# Patient Record
Sex: FEMALE | Race: BLACK OR AFRICAN AMERICAN | NOT HISPANIC OR LATINO | Employment: FULL TIME | URBAN - METROPOLITAN AREA
[De-identification: names, ages, dates, MRNs, and addresses within clinical notes are randomized per-mention and may not be internally consistent; named-entity substitution may affect disease eponyms.]

---

## 2022-11-26 ENCOUNTER — APPOINTMENT (OUTPATIENT)
Dept: GENERAL RADIOLOGY | Facility: CLINIC | Age: 62
End: 2022-11-26
Attending: EMERGENCY MEDICINE
Payer: COMMERCIAL

## 2022-11-26 ENCOUNTER — HOSPITAL ENCOUNTER (EMERGENCY)
Facility: CLINIC | Age: 62
Discharge: HOME | End: 2022-11-26
Attending: EMERGENCY MEDICINE | Admitting: EMERGENCY MEDICINE
Payer: COMMERCIAL

## 2022-11-26 VITALS
SYSTOLIC BLOOD PRESSURE: 154 MMHG | HEART RATE: 95 BPM | DIASTOLIC BLOOD PRESSURE: 86 MMHG | OXYGEN SATURATION: 96 % | BODY MASS INDEX: 38.45 KG/M2 | TEMPERATURE: 99.1 F | RESPIRATION RATE: 18 BRPM | WEIGHT: 268 LBS

## 2022-11-26 DIAGNOSIS — J45.21 EXACERBATION OF INTERMITTENT ASTHMA, UNSPECIFIED ASTHMA SEVERITY: ICD-10-CM

## 2022-11-26 DIAGNOSIS — J10.1 INFLUENZA A: ICD-10-CM

## 2022-11-26 LAB
ATRIAL RATE - MUSE: 79 BPM
DIASTOLIC BLOOD PRESSURE - MUSE: NORMAL MMHG
FLUAV RNA SPEC QL NAA+PROBE: POSITIVE
FLUBV RNA RESP QL NAA+PROBE: NEGATIVE
INTERPRETATION ECG - MUSE: NORMAL
P AXIS - MUSE: 70 DEGREES
PR INTERVAL - MUSE: 162 MS
QRS DURATION - MUSE: 78 MS
QT - MUSE: 350 MS
QTC - MUSE: 401 MS
R AXIS - MUSE: 70 DEGREES
RSV RNA SPEC NAA+PROBE: NEGATIVE
SARS-COV-2 RNA RESP QL NAA+PROBE: NEGATIVE
SYSTOLIC BLOOD PRESSURE - MUSE: NORMAL MMHG
T AXIS - MUSE: 39 DEGREES
VENTRICULAR RATE- MUSE: 79 BPM

## 2022-11-26 PROCEDURE — 87637 SARSCOV2&INF A&B&RSV AMP PRB: CPT | Performed by: EMERGENCY MEDICINE

## 2022-11-26 PROCEDURE — C9803 HOPD COVID-19 SPEC COLLECT: HCPCS

## 2022-11-26 PROCEDURE — 93005 ELECTROCARDIOGRAM TRACING: CPT

## 2022-11-26 PROCEDURE — 99285 EMERGENCY DEPT VISIT HI MDM: CPT | Mod: CS,25

## 2022-11-26 PROCEDURE — 71046 X-RAY EXAM CHEST 2 VIEWS: CPT

## 2022-11-26 RX ORDER — PREDNISONE 20 MG/1
TABLET ORAL
Qty: 10 TABLET | Refills: 0 | Status: SHIPPED | OUTPATIENT
Start: 2022-11-26 | End: 2023-03-18

## 2022-11-26 RX ORDER — ALBUTEROL SULFATE 0.83 MG/ML
2.5 SOLUTION RESPIRATORY (INHALATION) EVERY 4 HOURS PRN
Qty: 3 ML | Refills: 0 | Status: SHIPPED | OUTPATIENT
Start: 2022-11-26 | End: 2023-09-28

## 2022-11-26 RX ORDER — OXYCODONE HYDROCHLORIDE 5 MG/1
5 TABLET ORAL EVERY 6 HOURS PRN
Qty: 12 TABLET | Refills: 0 | Status: SHIPPED | OUTPATIENT
Start: 2022-11-26 | End: 2022-11-29

## 2022-11-26 ASSESSMENT — ENCOUNTER SYMPTOMS
COUGH: 1
VOMITING: 0
SHORTNESS OF BREATH: 1
FEVER: 1

## 2022-11-26 NOTE — ED TRIAGE NOTES
Pt complains of a cough, fever, HA, SOB, and severe CP when coughing that started last Wednesday.     Triage Assessment     Row Name 11/26/22 1203       Triage Assessment (Adult)    Airway WDL WDL       Respiratory WDL    Respiratory WDL WDL       Skin Circulation/Temperature WDL    Skin Circulation/Temperature WDL WDL       Cardiac WDL    Cardiac WDL WDL       Peripheral/Neurovascular WDL    Peripheral Neurovascular WDL WDL       Cognitive/Neuro/Behavioral WDL    Cognitive/Neuro/Behavioral WDL WDL

## 2022-11-26 NOTE — ED PROVIDER NOTES
History   Chief Complaint:  Flu Symptoms and Shortness of Breath     The history is provided by the patient.      Willy Aquino is a 62 year old female with history of hyperlipidemia and asthma who presents with a cough, fever, shortness of breath, and severe chest pain when coughing, worsening since onset 3 days ago. The patient has been taking at-home Advair and Proventil for symptom management, however persisting symptoms without relief prompted concern and her presentation to ED at this time. She has had no vomiting. Of note, the patient's neighbor smokes marijuana and she states this causes exacerbation of her asthma flares.    Review of Systems   Constitutional: Positive for fever.   Respiratory: Positive for cough and shortness of breath.    Cardiovascular: Positive for chest pain (only when coughing).   Gastrointestinal: Negative for vomiting.   All other systems reviewed and are negative.     Allergies:  Chloroquine    Medications:  Albuterol  Benzonatate  Cholecalciferol  Advair Diskus  Robitussin  Cholecalciferol  Advair  Proventil  Montelukast    Past Medical History:     TMJ  Chronic anemia  Asthma  Hyperlipidemia  Memory loss  Midline cystocele arthritis of knee  Vitamin D deficiency  Prediabetes  Primary insomnia  PTSD    Past Surgical History:    Appendectomy  D&C    Family History:    Liver disease  Alcohol abuse  Breast cancer    Social History:  The patient presents to the ED with a friend.  The patient arrived in a private vehicle.  PCP: Livier Cyr     Physical Exam     Patient Vitals for the past 24 hrs:   BP Temp Temp src Pulse Resp SpO2 Weight   11/26/22 1208 (!) 154/86 99.1  F (37.3  C) Oral 95 18 96 % 121.6 kg (268 lb)     Physical Exam   GENERAL: well developed, pleasant  HEAD: atraumatic  EYES: pupils reactive, extraocular muscles intact, conjunctivae normal  ENT:  mucus membranes moist  NECK:  trachea midline, normal range of motion  RESPIRATORY: no tachypnea, occasional  wheeze  CVS: normal S1/S2, no murmurs, intact distal pulses  ABDOMEN: soft, nontender, nondistention  MUSCULOSKELETAL: no deformities  SKIN: warm and dry, no acute rashes or ulceration  NEURO: GCS 15, cranial nerves intact, alert and oriented x3  PSYCH:  Mood/affect normal    Emergency Department Course   ECG  ECG results from 11/26/22   EKG 12 lead     Value    Systolic Blood Pressure     Diastolic Blood Pressure     Ventricular Rate 79    Atrial Rate 79    AZ Interval 162    QRS Duration 78        QTc 401    P Axis 70    R AXIS 70    T Axis 39    Interpretation ECG      Sinus rhythm  Septal infarct , age undetermined  Abnormal ECG  When compared with ECG of 15-MAY-2014 01:39,  No significant change was found  Confirmed by GENERATED REPORT, COMPUTER ([id number]),  Zuleyma Brown ([id number]) on 11/26/2022 12:19:51 PM       Imaging:  Chest XR,  PA & LAT   Final Result   IMPRESSION: Negative chest.        Report per radiology    Laboratory:  Labs Ordered and Resulted from Time of ED Arrival to Time of ED Departure   INFLUENZA A/B & SARS-COV2 PCR MULTIPLEX - Abnormal       Result Value    Influenza A PCR Positive (*)     Influenza B PCR Negative      RSV PCR Negative      SARS CoV2 PCR Negative        Emergency Department Course:      Reviewed:  I reviewed nursing notes, vitals, past medical history and Care Everywhere.    Assessments:  1605 I obtained history and examined the patient as noted above. I explained findings and believe that they are safe for discharge at this time.     Disposition:  The patient was discharged to home.     Impression & Plan     Medical Decision Making:    Patient presents with fevers chills cough and severe pain with coughing where she gets into a spasm.  She had an episode while I was examining her and looks to have significant pain with coughing but then is comfortable when not coughing.  This looks to be either combination of musculoskeletal pleuritic and or diaphragmatic  irritation.  She otherwise satting well.  Influenza A is positive.  No infiltrates on chest x-ray.  Discussed outpatient management with her.    Diagnosis:    ICD-10-CM    1. Influenza A  J10.1       2. Exacerbation of intermittent asthma, unspecified asthma severity  J45.21         Discharge Medications:  New Prescriptions    ALBUTEROL (PROVENTIL) (2.5 MG/3ML) 0.083% NEB SOLUTION    Take 1 vial (2.5 mg) by nebulization every 4 hours as needed for shortness of breath / dyspnea    OXYCODONE (ROXICODONE) 5 MG TABLET    Take 1 tablet (5 mg) by mouth every 6 hours as needed for moderate to severe pain    PREDNISONE (DELTASONE) 20 MG TABLET    Take two tablets (= 40mg) each day for 5 (five) days     Scribe Disclosure:  DARCY, Isidra Kessler, am serving as a scribe at 3:51 PM on 11/26/2022 to document services personally performed by Barrera Gary MD based on my observations and the provider's statements to me.     Barrera Gary MD  11/27/22 2250

## 2022-12-15 ENCOUNTER — DOCUMENTATION ONLY (OUTPATIENT)
Dept: EMERGENCY MEDICINE | Facility: CLINIC | Age: 62
End: 2022-12-15
Payer: COMMERCIAL

## 2022-12-15 DIAGNOSIS — Z53.9 ERRONEOUS ENCOUNTER--DISREGARD: ICD-10-CM

## 2022-12-15 DIAGNOSIS — J45.21 MILD INTERMITTENT ASTHMA WITH EXACERBATION: Primary | ICD-10-CM

## 2022-12-23 ENCOUNTER — DOCUMENTATION ONLY (OUTPATIENT)
Dept: EMERGENCY MEDICINE | Facility: CLINIC | Age: 62
End: 2022-12-23

## 2022-12-23 DIAGNOSIS — J45.21 MILD INTERMITTENT ASTHMA WITH EXACERBATION: Primary | ICD-10-CM

## 2023-01-30 ENCOUNTER — APPOINTMENT (OUTPATIENT)
Dept: GENERAL RADIOLOGY | Facility: CLINIC | Age: 63
End: 2023-01-30
Attending: EMERGENCY MEDICINE
Payer: COMMERCIAL

## 2023-01-30 ENCOUNTER — HOSPITAL ENCOUNTER (EMERGENCY)
Facility: CLINIC | Age: 63
Discharge: HOME | End: 2023-01-30
Attending: EMERGENCY MEDICINE | Admitting: EMERGENCY MEDICINE
Payer: COMMERCIAL

## 2023-01-30 VITALS
DIASTOLIC BLOOD PRESSURE: 79 MMHG | OXYGEN SATURATION: 99 % | HEART RATE: 64 BPM | TEMPERATURE: 97.6 F | SYSTOLIC BLOOD PRESSURE: 155 MMHG | WEIGHT: 268 LBS | BODY MASS INDEX: 38.45 KG/M2 | RESPIRATION RATE: 18 BRPM

## 2023-01-30 DIAGNOSIS — S93.401A SPRAIN OF RIGHT ANKLE, UNSPECIFIED LIGAMENT, INITIAL ENCOUNTER: ICD-10-CM

## 2023-01-30 DIAGNOSIS — S93.402A SPRAIN OF LEFT ANKLE, UNSPECIFIED LIGAMENT, INITIAL ENCOUNTER: ICD-10-CM

## 2023-01-30 PROCEDURE — 73630 X-RAY EXAM OF FOOT: CPT | Mod: RT

## 2023-01-30 PROCEDURE — 99285 EMERGENCY DEPT VISIT HI MDM: CPT

## 2023-01-30 PROCEDURE — 73610 X-RAY EXAM OF ANKLE: CPT | Mod: LT

## 2023-01-30 ASSESSMENT — ACTIVITIES OF DAILY LIVING (ADL): ADLS_ACUITY_SCORE: 33

## 2023-01-31 ASSESSMENT — ENCOUNTER SYMPTOMS: WOUND: 1

## 2023-01-31 NOTE — ED TRIAGE NOTES
Patient presents with complaints of right foot pain that started 3 days ago when she was exercising. Pain is localized to the top of her right foot.      Triage Assessment     Row Name 01/30/23 2035       Triage Assessment (Adult)    Airway WDL WDL       Respiratory WDL    Respiratory WDL WDL       Skin Circulation/Temperature WDL    Skin Circulation/Temperature WDL WDL       Cardiac WDL    Cardiac WDL WDL       Peripheral/Neurovascular WDL    Peripheral Neurovascular WDL WDL       Cognitive/Neuro/Behavioral WDL    Cognitive/Neuro/Behavioral WDL WDL

## 2023-01-31 NOTE — ED NOTES
Rapid Assessment Note    History:   Willy Aquino is a 62 year old female who presents with right dorsal foot pain. She was going up the stairs and skipped two steps at once and consequently injured her right foot through an unspecified mechanism about 3 days ago. She is able to ambulate on the foot but limps. She was able to drive herself here.       Exam:   General:  Alert, interactive, limping  Cardiovascular:  Well perfused  Lungs:  No respiratory distress, no accessory muscle use  Musculoskeletal: Tenderness to palpation to the proximal right foot with no tenderness to palpation over the bilateral malleolus of the right ankle  Neuro:  Moving all 4 extremities  Skin:  Warm, dry  Psych:  Normal affect      Plan of Care:   I evaluated the patient and developed an initial plan of care. I discussed this plan and explained that I, or one of my partners, would be returning to complete the evaluation.       I, Jonna Street, am serving as a scribe to document services personally performed by Morris Simental MD, based on my observations and the provider's statements to me.    1/30/2023  [facility]    Portions of this medical record were completed by a scribe. UPON MY REVIEW AND AUTHENTICATION BY ELECTRONIC SIGNATURE, this confirms (a) I performed the applicable clinical services, and (b) the record is accurate.      Morris Simental MD  01/31/23 0007

## 2023-01-31 NOTE — ED PROVIDER NOTES
History     Chief Complaint:  Foot Pain (Right)       The history is provided by the patient.      Willy Aquino is a 62 year old female with a history of hyperlipidemia, prediabetes, and TMJ who presents with right foot pain. Patient reports that she had a fall incident in the bathroom about 5 weeks ago. She did injure her left ankle and says it is tender to touch. She states that 3 days ago, she was climbing 2 stairs at a time when she stepped funny onto her right foot. She is able to walk, but is not able to bear much weight onto her right side. She states that her left hip is starting to hurt due to having to bear lots of weight on that side. She has been taking Aleve with no relief. She has not tried treating it with cold compressions or wrapping it.       Independent Historian: No    Review of External Notes: Care Everywhere    ROS:  Review of Systems   Skin: Positive for wound.   All other systems reviewed and are negative.      Allergies:  Chloroquine     Medications:    Albuterol  Benzonatate  Fluticasone-salmeterol  Guaifenesin-codeine  Prednisone     Past Medical History:    Anemia  Asthma  Bruxism  Disorder of jaw  TMJ  Hyperlipidemia  Midline cystocele   Arthritis of knee  Plantar fasciitis  Vitamin D deficiency  Prediabetes  PTSD  Influenza A    Past Surgical History:    Appendectomy  Dilation and curettage    Family History:    Mother - breast cancer  Father - liver disease, alcohol abuse    Social History:  reports that she has never smoked. She does not have any smokeless tobacco history on file. She reports current alcohol use.  Presents alone.  Presents via private vehicle.   PCP: Livier Cyr     Physical Exam     Patient Vitals for the past 24 hrs:   BP Temp Temp src Pulse Resp SpO2 Weight   01/30/23 2038 (!) 155/79 97.6  F (36.4  C) Temporal 64 18 99 % 121.6 kg (268 lb)        Physical Exam  Middle age black female. Supine. No respiratory distress.   Muscular skeletal:    Left leg: no  tenderness of knee or calf. Distal fibula tenderness.   No joint diffiusion. Medial malleolus tenderness. No achilles tenderness. Able to dorsi and plantar flex and invert and rajesh. Distal CMS still intact.    Right leg: no tenderness of knee or calf,. slight tenderness inferior to malleolus and dorsal to midfoot. Able to dorsir and plantar flex. Able to invert. Pain with eversion. CMS distally intact.   Neuro: Alert. Awake. Appropriate.     Emergency Department Course   Imaging:  Ankle XR, G/E 3 views, right   Final Result   IMPRESSION: Normal joint spaces and alignment. No fracture.      XR Foot Right 3 Views   Final Result   IMPRESSION: No fractures are identified. Bunion deformity of the great toe. Mild degenerative changes in the first MTP joint. The remaining joint spaces are unremarkable.      XR Ankle Left G/E 3 Views   Final Result   IMPRESSION: No fractures are evident. Lateral soft tissue swelling. The ankle mortise is intact. The talar dome is unremarkable.         Report per radiology    Emergency Department Course & Assessments:     Independent Interpretation (X-rays, CTs, rhythm strip):  I agree, as stated above.     ED Course as of 01/31/23 0029   Mon Jan 30, 2023 2118 I examined and obtained patient's history.        Social Determinants of Health affecting care:  None.     Disposition:  The patient was discharged to home.     Impression & Plan    Medical Decision Making:  This is a 62 year old woman, who states going up some stairs about 3 days ago and stepped funny on her right foot. She now has pain on top of her foot. She also feels some pain in the medial aspect of her ankle. In addition, she states, about 5 weeks ago, she misstep going into the bathtub and hurt her left ankle. This has been painful for 5 weeks, more on the outside of the ankle but has not seen anybody for it. She has been walking and has occasionally taking Aleve for comfort. On exam of both ankles, there was some left ankle  tenderness, more over the distal fibula. It is not directly over the malleolus. There was no obvious effusion or ligament elasticity. There was no foot tenderness. On the right foot, there was minimal tenderness over the very proximal dorsum of foot. But there was some tenderness over the medial joint space, no effusion there or ligament elasticity. Xray of both ankles and the right foot are negative. This seems most consistent with subacute left ankle marry and more acute right ankle sprain. We placed a strap on the right foot and put bilateral air splint. She should use cold and elevation. She may continue with Aleve. I have referred her to our orthopedics on-call, Dr. THANIA Tovar.     Diagnosis:    ICD-10-CM    1. Sprain of right ankle, unspecified ligament, initial encounter  S93.401A       2. Sprain of left ankle, unspecified ligament, initial encounter  S93.402A              Scribe Disclosure:  I, Yadiel Claudio, am serving as a scribe at 9:27 PM on 1/30/2023 to document services personally performed by Sidney Sifuentes MD based on my observations and the provider's statements to me.     1/30/2023   Sidney Sifuentes MD Steinman, Randall Ira, MD  01/31/23 0118

## 2023-03-17 ENCOUNTER — HOSPITAL ENCOUNTER (EMERGENCY)
Facility: CLINIC | Age: 63
Discharge: HOME | End: 2023-03-18
Attending: EMERGENCY MEDICINE | Admitting: EMERGENCY MEDICINE
Payer: COMMERCIAL

## 2023-03-17 ENCOUNTER — APPOINTMENT (OUTPATIENT)
Dept: GENERAL RADIOLOGY | Facility: CLINIC | Age: 63
End: 2023-03-17
Attending: EMERGENCY MEDICINE
Payer: COMMERCIAL

## 2023-03-17 VITALS
HEART RATE: 79 BPM | OXYGEN SATURATION: 100 % | RESPIRATION RATE: 18 BRPM | SYSTOLIC BLOOD PRESSURE: 120 MMHG | DIASTOLIC BLOOD PRESSURE: 61 MMHG | WEIGHT: 248 LBS | HEIGHT: 69 IN | TEMPERATURE: 99.7 F | BODY MASS INDEX: 36.73 KG/M2

## 2023-03-17 DIAGNOSIS — U07.1 INFECTION DUE TO 2019 NOVEL CORONAVIRUS: ICD-10-CM

## 2023-03-17 LAB
ANION GAP SERPL CALCULATED.3IONS-SCNC: 9 MMOL/L (ref 7–15)
BASOPHILS # BLD AUTO: 0 10E3/UL (ref 0–0.2)
BASOPHILS NFR BLD AUTO: 0 %
BUN SERPL-MCNC: 7.1 MG/DL (ref 8–23)
CALCIUM SERPL-MCNC: 9.5 MG/DL (ref 8.8–10.2)
CHLORIDE SERPL-SCNC: 100 MMOL/L (ref 98–107)
CREAT SERPL-MCNC: 0.8 MG/DL (ref 0.51–0.95)
DEPRECATED HCO3 PLAS-SCNC: 29 MMOL/L (ref 22–29)
EOSINOPHIL # BLD AUTO: 0 10E3/UL (ref 0–0.7)
EOSINOPHIL NFR BLD AUTO: 0 %
ERYTHROCYTE [DISTWIDTH] IN BLOOD BY AUTOMATED COUNT: 14.6 % (ref 10–15)
FLUAV RNA SPEC QL NAA+PROBE: NEGATIVE
FLUBV RNA RESP QL NAA+PROBE: NEGATIVE
GFR SERPL CREATININE-BSD FRML MDRD: 84 ML/MIN/1.73M2
GLUCOSE SERPL-MCNC: 99 MG/DL (ref 70–99)
HCT VFR BLD AUTO: 34.3 % (ref 35–47)
HGB BLD-MCNC: 10.9 G/DL (ref 11.7–15.7)
HOLD SPECIMEN: NORMAL
IMM GRANULOCYTES # BLD: 0 10E3/UL
IMM GRANULOCYTES NFR BLD: 0 %
LYMPHOCYTES # BLD AUTO: 0.5 10E3/UL (ref 0.8–5.3)
LYMPHOCYTES NFR BLD AUTO: 15 %
MCH RBC QN AUTO: 26.9 PG (ref 26.5–33)
MCHC RBC AUTO-ENTMCNC: 31.8 G/DL (ref 31.5–36.5)
MCV RBC AUTO: 85 FL (ref 78–100)
MONOCYTES # BLD AUTO: 0.3 10E3/UL (ref 0–1.3)
MONOCYTES NFR BLD AUTO: 9 %
NEUTROPHILS # BLD AUTO: 2.4 10E3/UL (ref 1.6–8.3)
NEUTROPHILS NFR BLD AUTO: 76 %
NRBC # BLD AUTO: 0 10E3/UL
NRBC BLD AUTO-RTO: 0 /100
PLATELET # BLD AUTO: 215 10E3/UL (ref 150–450)
POTASSIUM SERPL-SCNC: 3.9 MMOL/L (ref 3.4–5.3)
RBC # BLD AUTO: 4.05 10E6/UL (ref 3.8–5.2)
RSV RNA SPEC NAA+PROBE: NEGATIVE
SARS-COV-2 RNA RESP QL NAA+PROBE: POSITIVE
SODIUM SERPL-SCNC: 138 MMOL/L (ref 136–145)
WBC # BLD AUTO: 3.2 10E3/UL (ref 4–11)

## 2023-03-17 PROCEDURE — 250N000009 HC RX 250: Performed by: EMERGENCY MEDICINE

## 2023-03-17 PROCEDURE — 250N000013 HC RX MED GY IP 250 OP 250 PS 637: Performed by: EMERGENCY MEDICINE

## 2023-03-17 PROCEDURE — 36415 COLL VENOUS BLD VENIPUNCTURE: CPT | Performed by: EMERGENCY MEDICINE

## 2023-03-17 PROCEDURE — 250N000012 HC RX MED GY IP 250 OP 636 PS 637: Performed by: EMERGENCY MEDICINE

## 2023-03-17 PROCEDURE — 71045 X-RAY EXAM CHEST 1 VIEW: CPT

## 2023-03-17 PROCEDURE — 85025 COMPLETE CBC W/AUTO DIFF WBC: CPT | Performed by: EMERGENCY MEDICINE

## 2023-03-17 PROCEDURE — 87637 SARSCOV2&INF A&B&RSV AMP PRB: CPT | Performed by: EMERGENCY MEDICINE

## 2023-03-17 PROCEDURE — C9803 HOPD COVID-19 SPEC COLLECT: HCPCS

## 2023-03-17 PROCEDURE — 99284 EMERGENCY DEPT VISIT MOD MDM: CPT | Mod: CS,25

## 2023-03-17 PROCEDURE — 94640 AIRWAY INHALATION TREATMENT: CPT

## 2023-03-17 PROCEDURE — 82310 ASSAY OF CALCIUM: CPT | Performed by: EMERGENCY MEDICINE

## 2023-03-17 RX ORDER — PREDNISONE 20 MG/1
40 TABLET ORAL ONCE
Status: COMPLETED | OUTPATIENT
Start: 2023-03-17 | End: 2023-03-17

## 2023-03-17 RX ORDER — ACETAMINOPHEN 500 MG
1000 TABLET ORAL ONCE
Status: COMPLETED | OUTPATIENT
Start: 2023-03-17 | End: 2023-03-17

## 2023-03-17 RX ORDER — IPRATROPIUM BROMIDE AND ALBUTEROL SULFATE 2.5; .5 MG/3ML; MG/3ML
3 SOLUTION RESPIRATORY (INHALATION)
Status: DISCONTINUED | OUTPATIENT
Start: 2023-03-17 | End: 2023-03-18 | Stop reason: HOSPADM

## 2023-03-17 RX ADMIN — ACETAMINOPHEN 1000 MG: 500 TABLET ORAL at 20:52

## 2023-03-17 RX ADMIN — IPRATROPIUM BROMIDE AND ALBUTEROL SULFATE 3 ML: 2.5; .5 SOLUTION RESPIRATORY (INHALATION) at 21:23

## 2023-03-17 RX ADMIN — PREDNISONE 40 MG: 20 TABLET ORAL at 20:52

## 2023-03-17 ASSESSMENT — ENCOUNTER SYMPTOMS
VOMITING: 0
SORE THROAT: 1
DIARRHEA: 0
MYALGIAS: 1
COUGH: 1
CHILLS: 1
HEADACHES: 1
FEVER: 1

## 2023-03-17 ASSESSMENT — ACTIVITIES OF DAILY LIVING (ADL)
ADLS_ACUITY_SCORE: 35
ADLS_ACUITY_SCORE: 35

## 2023-03-18 RX ORDER — FLUTICASONE PROPIONATE AND SALMETEROL 500; 50 UG/1; UG/1
1 POWDER RESPIRATORY (INHALATION) EVERY 12 HOURS
Qty: 1 EACH | Refills: 0 | Status: SHIPPED | OUTPATIENT
Start: 2023-03-18 | End: 2023-09-28

## 2023-03-18 RX ORDER — PREDNISONE 20 MG/1
40 TABLET ORAL DAILY
Qty: 8 TABLET | Refills: 0 | Status: SHIPPED | OUTPATIENT
Start: 2023-03-18 | End: 2023-03-22

## 2023-03-18 ASSESSMENT — ACTIVITIES OF DAILY LIVING (ADL): ADLS_ACUITY_SCORE: 35

## 2023-03-18 NOTE — ED PROVIDER NOTES
History     Chief Complaint:  Flu Symptoms (Cough, body aches, fever and nasal congestion. )       The history is provided by the patient.      Willy Aquino is a 62 year old female who presents to the Emergency Department for evaluation of flu symptoms. The patient states that 3 days ago she began to experience congestion, cough, fever, chills, general myalgia, headaches and sore throat which has persisted, leading her to come to the ED. She mentions that she got the Flu last November and feels similar to then. She denies any vomiting or diarrhea. No shortness of breath. Patient did use her Proair inhaler last night as her asthma is mildly bothersome since she has been ill. She denies any other symptoms.    Independent Historian:   None - Patient Only    Review of External Notes: none     ROS:  Review of Systems   Constitutional: Positive for chills and fever.   HENT: Positive for congestion and sore throat.    Respiratory: Positive for cough.    Gastrointestinal: Negative for diarrhea and vomiting.   Musculoskeletal: Positive for myalgias.   Neurological: Positive for headaches.   All other systems reviewed and are negative.      Allergies:  Chloroquine     Medications:    Albuterol   Tessalon     Past Medical History:    Anemia   Asthma   Bruxism   Hyperlipidemia   Prediabetes   Insomnia   PTSD  Memory loss     Past Surgical History:    Appendectomy    D and C    Family History:    Breast cancer    Liver disease     Social History:   reports that she has never smoked. She does not have any smokeless tobacco history on file. She reports current alcohol use.  PCP: Livier Cyr     Physical Exam     Patient Vitals for the past 24 hrs:   BP Temp Temp src Pulse Resp SpO2 Height Weight   03/17/23 2336 -- 99.7  F (37.6  C) Oral -- -- -- -- --   03/17/23 2331 -- -- -- -- -- 100 % -- --   03/17/23 2330 120/61 -- -- 79 -- -- -- --   03/17/23 2134 -- -- -- -- -- 100 % -- --   03/17/23 2030 -- (!) 101.5  F (38.6  C)  "Oral -- -- -- -- --   03/17/23 2002 (!) 143/88 -- -- 66 -- -- -- --   03/17/23 1930 (!) 147/75 98.4  F (36.9  C) Temporal 73 18 97 % 1.753 m (5' 9\") 112.5 kg (248 lb)        Physical Exam  General: Nontoxic. Resting comfortably. Frequent cough.  Head:  Scalp, face, and head appear normal  Eyes:  Pupils are equal, round    Conjunctivae non-injected and sclerae white  ENT:    The external nose is normal    Pinnae are normal  Neck:  Normal range of motion    There is no rigidity noted    Trachea is in the midline  CV:  Regular rate and rhythm     Normal S1/S2, no S3/S4    No murmur or rub. Radial pulses 2+ bilaterally.  Resp:  Lungs are equal bilaterally  There is no tachypnea    No increased work of breathing    No rales, or rhonchi. Mild expiratory wheezing in bilateral lower lung fields  GI:  Abdomen is soft, no rigidity or guarding    No distension, or mass    No tenderness or rebound tenderness   MS:  Normal muscular tone  Skin:  No rash or acute skin lesions noted  Neuro: Awake and alert  Speech is normal and fluent  Moves all extremities spontaneously  Psych:  Normal affect. Appropriate interactions.      Emergency Department Course     Imaging:  XR Chest Port 1 View   Final Result   IMPRESSION: Negative chest.         Report per radiology    Laboratory:  Labs Ordered and Resulted from Time of ED Arrival to Time of ED Departure   INFLUENZA A/B, RSV, & SARS-COV2 PCR - Abnormal       Result Value    Influenza A PCR Negative      Influenza B PCR Negative      RSV PCR Negative      SARS CoV2 PCR Positive (*)    BASIC METABOLIC PANEL - Abnormal    Sodium 138      Potassium 3.9      Chloride 100      Carbon Dioxide (CO2) 29      Anion Gap 9      Urea Nitrogen 7.1 (*)     Creatinine 0.80      Calcium 9.5      Glucose 99      GFR Estimate 84     CBC WITH PLATELETS AND DIFFERENTIAL - Abnormal    WBC Count 3.2 (*)     RBC Count 4.05      Hemoglobin 10.9 (*)     Hematocrit 34.3 (*)     MCV 85      MCH 26.9      MCHC 31.8   "    RDW 14.6      Platelet Count 215      % Neutrophils 76      % Lymphocytes 15      % Monocytes 9      % Eosinophils 0      % Basophils 0      % Immature Granulocytes 0      NRBCs per 100 WBC 0      Absolute Neutrophils 2.4      Absolute Lymphocytes 0.5 (*)     Absolute Monocytes 0.3      Absolute Eosinophils 0.0      Absolute Basophils 0.0      Absolute Immature Granulocytes 0.0      Absolute NRBCs 0.0          Procedures       Emergency Department Course & Assessments:         Interventions:  Medications   acetaminophen (TYLENOL) tablet 1,000 mg (1,000 mg Oral $Given 3/17/23 2052)   predniSONE (DELTASONE) tablet 40 mg (40 mg Oral $Given 3/17/23 2052)        Independent Interpretation (X-rays, CTs, rhythm strip):  Consultations/Discussion of Management or Tests:  Assessments:  ED Course as of 03/18/23 1409   Fri Mar 17, 2023   2022 I examined the patient and obtained history as shown above       Social Determinants of Health affecting care:   None    Disposition:  The patient was discharged to home.     Impression & Plan        Medical Decision Making:  Willy Aquino is a 62 year old female who presents to the emergency department today with fever, cough, malaise, and symptoms of likely viral syndrome. A broad ddx was considered including viral and bacterial causes of infection including URI, pharyngitis, bronchitis, pneumonia, influenza, COVID-19, OM, Strep pharyngitis, sinus infection, among others. Clinically the patient is well appearing without increased work of breathing, respiratory distress, hypoxia, signs of ARDS or other serious decompensation or complication. Clinical signs and symptoms are not consistent with meningitis or sepsis. COVID-19 PCR positive. Negative for influenza and RSV. CXR was obtained and negative for acute findings. Patient treated with IVF, prednisone given her history of asthma. No severe asthma exacerbation, increased work of breathing, resp distress or hypoxia. Patient otherwise  well appearing. Paxlovid indicated and prescribed. Will continue 5 day burst of prednisone. Patient notes a hisory of known chronic anemia. Hgb 10.9 today. Will need PCP follow up.     I recommended supportive care for treatment of  COVID-19. Tylenol for fever control. Good oral fluid intake. Discussed the importance of home quarantine and CDC guidelines on self-quarantine were provided as part of discharge instructions. No indication for hospitalization at this time. Return precautions were discussed with patient. The patient's questions were answered and the patient was agreeable with discharge.          Diagnosis:    ICD-10-CM    1. Infection due to 2019 novel coronavirus  U07.1            Discharge Medications:  Discharge Medication List as of 3/18/2023  1:36 AM      START taking these medications    Details   !! fluticasone-salmeterol (ADVAIR) 500-50 MCG/ACT inhaler Inhale 1 puff into the lungs every 12 hours, Disp-1 each, R-0, E-Prescribe      nirmatrelvir and ritonavir (PAXLOVID) 300 mg/100 mg therapy pack Take 3 tablets by mouth 2 times daily for 5 days, Disp-30 tablet, R-0, E-Prescribe       !! - Potential duplicate medications found. Please discuss with provider.         Scribe Disclosure:  I, Modesto Oneal, am serving as a scribe at 7:44 PM on 3/17/2023 to document services personally performed by Domo Chandra MD based on my observations and the provider's statements to me.    3/17/2023   Domo Chandra MD Daro, Ryan Clay, MD  03/18/23 2012

## 2023-03-18 NOTE — ED TRIAGE NOTES
Triage Assessment     Row Name 03/17/23 1937       Triage Assessment (Adult)    Airway WDL WDL       Respiratory WDL    Respiratory WDL cough    Cough Frequency frequent    Cough Type productive       Skin Circulation/Temperature WDL    Skin Circulation/Temperature WDL WDL       Cardiac WDL    Cardiac WDL WDL       Peripheral/Neurovascular WDL    Peripheral Neurovascular WDL WDL       Cognitive/Neuro/Behavioral WDL    Cognitive/Neuro/Behavioral WDL WDL            Cough, body aches, fever and nasal congestion.

## 2023-03-18 NOTE — DISCHARGE INSTRUCTIONS

## 2023-09-28 ENCOUNTER — OFFICE VISIT (OUTPATIENT)
Dept: PEDIATRICS | Facility: CLINIC | Age: 63
End: 2023-09-28
Payer: COMMERCIAL

## 2023-09-28 VITALS
RESPIRATION RATE: 16 BRPM | HEIGHT: 69 IN | OXYGEN SATURATION: 99 % | DIASTOLIC BLOOD PRESSURE: 77 MMHG | WEIGHT: 268.7 LBS | HEART RATE: 60 BPM | BODY MASS INDEX: 39.8 KG/M2 | SYSTOLIC BLOOD PRESSURE: 118 MMHG | TEMPERATURE: 97.5 F

## 2023-09-28 DIAGNOSIS — Z00.00 ROUTINE GENERAL MEDICAL EXAMINATION AT A HEALTH CARE FACILITY: Primary | ICD-10-CM

## 2023-09-28 DIAGNOSIS — J45.909 ASTHMA, UNSPECIFIED ASTHMA SEVERITY, UNSPECIFIED WHETHER COMPLICATED, UNSPECIFIED WHETHER PERSISTENT: ICD-10-CM

## 2023-09-28 DIAGNOSIS — Z12.11 SCREEN FOR COLON CANCER: ICD-10-CM

## 2023-09-28 DIAGNOSIS — D64.9 ANEMIA, UNSPECIFIED TYPE: ICD-10-CM

## 2023-09-28 DIAGNOSIS — M25.562 LEFT KNEE PAIN, UNSPECIFIED CHRONICITY: ICD-10-CM

## 2023-09-28 LAB
ALBUMIN SERPL BCG-MCNC: 4.2 G/DL (ref 3.5–5.2)
ALP SERPL-CCNC: 38 U/L (ref 35–104)
ALT SERPL W P-5'-P-CCNC: 15 U/L (ref 0–50)
ANION GAP SERPL CALCULATED.3IONS-SCNC: 10 MMOL/L (ref 7–15)
AST SERPL W P-5'-P-CCNC: 24 U/L (ref 0–45)
BILIRUB SERPL-MCNC: 0.3 MG/DL
BUN SERPL-MCNC: 8.8 MG/DL (ref 8–23)
CALCIUM SERPL-MCNC: 9.4 MG/DL (ref 8.8–10.2)
CHLORIDE SERPL-SCNC: 106 MMOL/L (ref 98–107)
CHOLEST SERPL-MCNC: 186 MG/DL
CREAT SERPL-MCNC: 0.68 MG/DL (ref 0.51–0.95)
EGFRCR SERPLBLD CKD-EPI 2021: >90 ML/MIN/1.73M2
FERRITIN SERPL-MCNC: 101 NG/ML (ref 11–328)
GLUCOSE SERPL-MCNC: 100 MG/DL (ref 70–99)
HBA1C MFR BLD: 6 % (ref 0–5.6)
HCO3 SERPL-SCNC: 27 MMOL/L (ref 22–29)
HDLC SERPL-MCNC: 81 MG/DL
IRON BINDING CAPACITY (ROCHE): 243 UG/DL (ref 240–430)
IRON SATN MFR SERPL: 33 % (ref 15–46)
IRON SERPL-MCNC: 80 UG/DL (ref 37–145)
LDLC SERPL CALC-MCNC: 96 MG/DL
NONHDLC SERPL-MCNC: 105 MG/DL
POTASSIUM SERPL-SCNC: 4.3 MMOL/L (ref 3.4–5.3)
PROT SERPL-MCNC: 6.7 G/DL (ref 6.4–8.3)
SODIUM SERPL-SCNC: 143 MMOL/L (ref 135–145)
TRIGL SERPL-MCNC: 47 MG/DL

## 2023-09-28 PROCEDURE — 82728 ASSAY OF FERRITIN: CPT | Performed by: STUDENT IN AN ORGANIZED HEALTH CARE EDUCATION/TRAINING PROGRAM

## 2023-09-28 PROCEDURE — 83540 ASSAY OF IRON: CPT | Performed by: STUDENT IN AN ORGANIZED HEALTH CARE EDUCATION/TRAINING PROGRAM

## 2023-09-28 PROCEDURE — 80053 COMPREHEN METABOLIC PANEL: CPT | Performed by: STUDENT IN AN ORGANIZED HEALTH CARE EDUCATION/TRAINING PROGRAM

## 2023-09-28 PROCEDURE — 99386 PREV VISIT NEW AGE 40-64: CPT | Mod: GC | Performed by: STUDENT IN AN ORGANIZED HEALTH CARE EDUCATION/TRAINING PROGRAM

## 2023-09-28 PROCEDURE — 90472 IMMUNIZATION ADMIN EACH ADD: CPT | Performed by: STUDENT IN AN ORGANIZED HEALTH CARE EDUCATION/TRAINING PROGRAM

## 2023-09-28 PROCEDURE — 80061 LIPID PANEL: CPT | Performed by: STUDENT IN AN ORGANIZED HEALTH CARE EDUCATION/TRAINING PROGRAM

## 2023-09-28 PROCEDURE — 83550 IRON BINDING TEST: CPT | Performed by: STUDENT IN AN ORGANIZED HEALTH CARE EDUCATION/TRAINING PROGRAM

## 2023-09-28 PROCEDURE — 90682 RIV4 VACC RECOMBINANT DNA IM: CPT | Performed by: STUDENT IN AN ORGANIZED HEALTH CARE EDUCATION/TRAINING PROGRAM

## 2023-09-28 PROCEDURE — 90715 TDAP VACCINE 7 YRS/> IM: CPT | Performed by: STUDENT IN AN ORGANIZED HEALTH CARE EDUCATION/TRAINING PROGRAM

## 2023-09-28 PROCEDURE — 83036 HEMOGLOBIN GLYCOSYLATED A1C: CPT | Performed by: STUDENT IN AN ORGANIZED HEALTH CARE EDUCATION/TRAINING PROGRAM

## 2023-09-28 PROCEDURE — 99214 OFFICE O/P EST MOD 30 MIN: CPT | Mod: GC | Performed by: STUDENT IN AN ORGANIZED HEALTH CARE EDUCATION/TRAINING PROGRAM

## 2023-09-28 PROCEDURE — 90750 HZV VACC RECOMBINANT IM: CPT | Performed by: STUDENT IN AN ORGANIZED HEALTH CARE EDUCATION/TRAINING PROGRAM

## 2023-09-28 PROCEDURE — 36415 COLL VENOUS BLD VENIPUNCTURE: CPT | Performed by: STUDENT IN AN ORGANIZED HEALTH CARE EDUCATION/TRAINING PROGRAM

## 2023-09-28 PROCEDURE — 90471 IMMUNIZATION ADMIN: CPT | Performed by: STUDENT IN AN ORGANIZED HEALTH CARE EDUCATION/TRAINING PROGRAM

## 2023-09-28 RX ORDER — PNV NO.95/FERROUS FUM/FOLIC AC 28MG-0.8MG
1 TABLET ORAL
Qty: 90 TABLET | Refills: 3 | Status: ON HOLD | OUTPATIENT
Start: 2023-09-28 | End: 2024-06-17

## 2023-09-28 RX ORDER — FLUTICASONE PROPIONATE AND SALMETEROL 500; 50 UG/1; UG/1
1 POWDER RESPIRATORY (INHALATION) EVERY 12 HOURS
Qty: 1 EACH | Refills: 0 | Status: SHIPPED | OUTPATIENT
Start: 2023-09-28 | End: 2024-05-09

## 2023-09-28 ASSESSMENT — ENCOUNTER SYMPTOMS
HEMATOCHEZIA: 0
PARESTHESIAS: 0
SHORTNESS OF BREATH: 0
DIZZINESS: 0
JOINT SWELLING: 0
SORE THROAT: 0
HEMATURIA: 0
FEVER: 0
COUGH: 0
ARTHRALGIAS: 1
CONSTIPATION: 0
CHILLS: 0
EYE PAIN: 0
BREAST MASS: 0
PALPITATIONS: 0
NAUSEA: 0
HEARTBURN: 0
NERVOUS/ANXIOUS: 0
DIARRHEA: 0
ABDOMINAL PAIN: 0
FREQUENCY: 0
WEAKNESS: 0
HEADACHES: 0
DYSURIA: 0
MYALGIAS: 0

## 2023-09-28 ASSESSMENT — ASTHMA QUESTIONNAIRES
QUESTION_3 LAST FOUR WEEKS HOW OFTEN DID YOUR ASTHMA SYMPTOMS (WHEEZING, COUGHING, SHORTNESS OF BREATH, CHEST TIGHTNESS OR PAIN) WAKE YOU UP AT NIGHT OR EARLIER THAN USUAL IN THE MORNING: NOT AT ALL
ACT_TOTALSCORE: 24
QUESTION_5 LAST FOUR WEEKS HOW WOULD YOU RATE YOUR ASTHMA CONTROL: WELL CONTROLLED
ACT_TOTALSCORE: 24
QUESTION_2 LAST FOUR WEEKS HOW OFTEN HAVE YOU HAD SHORTNESS OF BREATH: NOT AT ALL
QUESTION_1 LAST FOUR WEEKS HOW MUCH OF THE TIME DID YOUR ASTHMA KEEP YOU FROM GETTING AS MUCH DONE AT WORK, SCHOOL OR AT HOME: NONE OF THE TIME
QUESTION_4 LAST FOUR WEEKS HOW OFTEN HAVE YOU USED YOUR RESCUE INHALER OR NEBULIZER MEDICATION (SUCH AS ALBUTEROL): NOT AT ALL

## 2023-09-28 NOTE — PROGRESS NOTES
SUBJECTIVE:   CC: Willy is an 63 year old who presents for preventive health visit.       Healthy Habits:     Getting at least 3 servings of Calcium per day:  NO    Bi-annual eye exam:  NO    Dental care twice a year:  Yes    Sleep apnea or symptoms of sleep apnea:  None    Diet:  Regular (no restrictions)    Frequency of exercise:  1 day/week    Duration of exercise:  15-30 minutes    Taking medications regularly:  Yes    Medication side effects:  Other    Additional concerns today:  Yes    Patient is a 63-year-old female who presents to [facility] care. She notes that she has had swelling in her legs over the last few days that has resolved with elevation of the legs.  She is worried about whether there could be something going on with her heart.  She does not have chest pain, dyspnea, palpitations, presyncope on exertion.    She takes supplemental iron and Advair.  Surgical history notable for ruptured appendicitis.  Family history notable for breast cancer in her mother.  No family history of cardiovascular disease.  She lives in an apartment by herself.  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  Does consume some fruit and vegetables with her lunch and dinner.  Sleeps at midnight to 6 AM and feels slightly tired in the day.  Walks in the mall 15 to 30 minutes 1 daily but is open to increasing this to 5 days a week.  She does not consume any alcohol tobacco or drugs.    Today's PHQ-2 Score:       9/28/2023     8:21 AM   PHQ-2 ( 1999 [employer])   Q1: Little interest or pleasure in doing things 0   Q2: Feeling down, depressed or hopeless 0   PHQ-2 Score 0   Q1: Little interest or pleasure in doing things Not at all   Q2: Feeling down, depressed or hopeless Not at all   PHQ-2 Score 0       Social History     Tobacco Use    Smoking status: Never    Smokeless tobacco: Never   Substance Use Topics    Alcohol use: Yes     Comment: occasional             9/28/2023     8:20 AM   Alcohol Use   Prescreen: >3 drinks/day or >7 drinks/week?  Not Applicable          No data to display              Reviewed orders with patient.  Reviewed health maintenance and updated orders accordingly - Yes  Lab work is in process    Breast Cancer Screening:  Any new diagnosis of family breast, ovarian, or bowel cancer? No    FHS-7:        No data to display                Pertinent mammograms are reviewed under the imaging tab.    History of abnormal Pap smear: NO - age 30- 65 PAP every 3 years recommended     Reviewed and updated as needed this visit by clinical staff   Tobacco  Allergies  Meds              Reviewed and updated as needed this visit by Provider     Meds             Past Medical History:   Diagnosis Date    Anemia 11/8/2012    Asthma 11/8/2012    Atypical face pain 8/14/2012    Bruxism 11/8/2012    Disorder of jaw 8/14/2012    Headaches 8/14/2012    Right knee pain 11/8/2012    Snores 11/8/2012    TMJ (temporomandibular joint syndrome) 8/14/2012    Voice hoarseness     Wears glasses 11/8/2012      Past Surgical History:   Procedure Laterality Date    APPENDECTOMY  1994     OB History   No obstetric history on file.       Review of Systems   Constitutional:  Negative for chills and fever.   HENT:  Negative for congestion, ear pain, hearing loss and sore throat.    Eyes:  Positive for visual disturbance. Negative for pain.   Respiratory:  Negative for cough and shortness of breath.    Cardiovascular:  Positive for peripheral edema. Negative for chest pain and palpitations.   Gastrointestinal:  Negative for abdominal pain, constipation, diarrhea, heartburn, hematochezia and nausea.   Breasts:  Negative for tenderness, breast mass and discharge.   Genitourinary:  Negative for dysuria, frequency, genital sores, hematuria, pelvic pain, urgency, vaginal bleeding and vaginal discharge.   Musculoskeletal:  Positive for arthralgias. Negative for joint swelling and myalgias.   Skin:  Negative for rash.   Neurological:  Negative for dizziness, weakness,  "headaches and paresthesias.   Psychiatric/Behavioral:  Negative for mood changes. The patient is not nervous/anxious.         OBJECTIVE:   /77   Pulse 60   Temp 97.5  F (36.4  C)   Resp 16   Ht 1.747 m (5' 8.78\")   Wt 121.9 kg (268 lb 11.2 oz)   SpO2 99%   BMI 39.93 kg/m    Physical Exam  GENERAL: healthy, alert and no distress  EYES: Eyes grossly normal to inspection, PERRL and conjunctivae and sclerae normal  HENT: ear canals and TM's normal, nose and mouth without ulcers or lesions  NECK: no adenopathy, no asymmetry, masses, or scars and thyroid normal to palpation  RESP: lungs clear to auscultation - no rales, rhonchi or wheezes  CV: regular rate and rhythm, normal S1 S2, no S3 or S4, no murmur, click or rub, no peripheral edema and peripheral pulses strong  ABDOMEN: soft, nontender, no hepatosplenomegaly, no masses and bowel sounds normal  MS: no gross musculoskeletal defects noted, no edema  SKIN: no suspicious lesions or rashes  NEURO: Normal strength and tone, mentation intact and speech normal  PSYCH: mentation appears normal, affect normal/bright    Diagnostic Test Results:  Labs reviewed in Epic    ASSESSMENT/PLAN:   Willy was seen today for establish care and physical.    Diagnoses and all orders for this visit:    LE edema - resolved  Reassuring that the patient's reported bilateral lower extremity edema has resolved at this time.  Discussed the importance of increasing her current exercise regimen from 1 day/week to 5 days/week which would both help address venous stasis and promote improved cardiovascular health.  In the absence of chest pain, dyspnea on exertion, palpitations, presyncope on exertion, lower concern for cardiac disease process.  Lower suspicion for liver or kidney disease as the etiology but will evaluate with a comprehensive metabolic panel.    Routine general medical examination at a health care facility  -     Lipid panel reflex to direct LDL Non-fasting  -     *MA " "Screening Digital Bilateral  -     Comprehensive metabolic panel (BMP + Alb, Alk Phos, ALT, AST, Total. Bili, TP)  -     Hemoglobin A1c    Screen for colon cancer  -     Colonoscopy Screening  Referral    Asthma        -     fluticasone-salmeterol (ADVAIR) 500-50 MCG/ACT inhaler; Inhale 1 puff into the lungs every 12 hours    Anemia  The patient has had been stably anemic since 2006. As I do not see an iron panel in her labs, we will obtain one now.  -     IRON AND IRON BINDING CAPACITY  -     FERRITIN  -     Ferrous Sulfate (IRON) 325 (65 Fe) MG tablet; Take 1 tablet by mouth daily (with breakfast) for 360 days    Left knee pain  As we were concluding the visit, the patient mentioned chronic left knee discomfort for which we will place physical therapy referral.  -     Physical Therapy Referral; Future    Other orders  -     REVIEW OF HEALTH MAINTENANCE PROTOCOL ORDERS  -     ZOSTER RECOMBINANT ADJUVANTED (SHINGRIX)  -     TDAP 10-64Y (ADACEL,BOOSTRIX)  -     INFLUENZA VACCINE 18-64Y (FLUBLOK)        Patient has been advised of split billing requirements and indicates understanding: No      COUNSELING:  Reviewed preventive health counseling, as reflected in patient instructions       Regular exercise       Healthy diet/nutrition       Colorectal Cancer Screening      BMI:   Estimated body mass index is 39.93 kg/m  as calculated from the following:    Height as of this encounter: 1.747 m (5' 8.78\").    Weight as of this encounter: 121.9 kg (268 lb 11.2 oz).   Weight management plan: Discussed healthy diet and exercise guidelines      She reports that she has never smoked. She has never used smokeless tobacco.          Андрей Richardson MD  [facility] [place]    I have seen this patient and I was present during all critical and key portions of the procedure/exam and immediately available to furnish services during the entire service. I have reviewed the documentation from this resident and discussed " the findings with them, and I agree with the documentation their documentation.      Tom Elena MD  Internal Medicine and Pediatrics

## 2023-09-28 NOTE — LETTER
September 29, 2023      Willy Cooperbobby  [address]  [place] [address]        Willy,     Your iron studies, electrolytes, liver, and kidney panels, LDL cholesterol looked within normal limits.     However, your blood sugar (glucose) looks to be a bit above the normal range of 60-99.     Your diabetes blood test (A1c) was also slightly high at 6.0.     Impaired fasting glucose, as it is called, is a pre-diabetic state.  If not managed with weight loss, smaller portion sizes during meals, and limiting carbohydrate intake (with smaller servings of breads, pastas, rices, and sugars), it can lead to diabetes--which WOULD require aggressive use of medications, insulin, and frequent visits for blood tests.      I would recommend, if you are not overweight, that you limit your portion sizes of carbs (like rice, pasta, and bread) and eat more vegetables and lean meat (fish, chicken).  If you are a bit overweight, sometimes dropping only 10 pounds can reverse the impaired fasting glucose state. We should at least see you back for a recheck of this in 1 year, sooner if other problems develop.     In general, try to limit your total carbs during a meal to 60-75 grams, and try to limit your carbs at snack time to 20-30 grams.  Checking labels (and adding up the total carbs you eat) is important.     Hope you're well.       Tom Elena MD   Internal Medicine and Pediatrics                 Resulted Orders   Lipid panel reflex to direct LDL Non-fasting   Result Value Ref Range    Cholesterol 186 <200 mg/dL    Triglycerides 47 <150 mg/dL    Direct Measure HDL 81 >=50 mg/dL    LDL Cholesterol Calculated 96 <=100 mg/dL    Non HDL Cholesterol 105 <130 mg/dL    Narrative    Cholesterol  Desirable:  <200 mg/dL    Triglycerides  Normal:  Less than 150 mg/dL  Borderline High:  150-199 mg/dL  High:  200-499 mg/dL  Very High:  Greater than or equal to 500 mg/dL    Direct Measure HDL  Female:  Greater than or equal to 50 mg/dL    Male:  Greater than or equal to 40 mg/dL    LDL Cholesterol  Desirable:  <100mg/dL  Above Desirable:  100-129 mg/dL   Borderline High:  130-159 mg/dL   High:  160-189 mg/dL   Very High:  >= 190 mg/dL    Non HDL Cholesterol  Desirable:  130 mg/dL  Above Desirable:  130-159 mg/dL  Borderline High:  160-189 mg/dL  High:  190-219 mg/dL  Very High:  Greater than or equal to 220 mg/dL   Comprehensive metabolic panel (BMP + Alb, Alk Phos, ALT, AST, Total. Bili, TP)   Result Value Ref Range    Sodium 143 135 - 145 mmol/L      Comment:      Reference intervals for this test were updated on 09/26/2023 to more accurately reflect our healthy population. There may be differences in the flagging of prior results with similar values performed with this method. Interpretation of those prior results can be made in the context of the updated reference intervals.     Potassium 4.3 3.4 - 5.3 mmol/L    Carbon Dioxide (CO2) 27 22 - 29 mmol/L    Anion Gap 10 7 - 15 mmol/L    Urea Nitrogen 8.8 8.0 - 23.0 mg/dL    Creatinine 0.68 0.51 - 0.95 mg/dL    GFR Estimate >90 >60 mL/min/1.73m2    Calcium 9.4 8.8 - 10.2 mg/dL    Chloride 106 98 - 107 mmol/L    Glucose 100 (H) 70 - 99 mg/dL    Alkaline Phosphatase 38 35 - 104 U/L    AST 24 0 - 45 U/L      Comment:      Reference intervals for this test were updated on 6/12/2023 to more accurately reflect our healthy population. There may be differences in the flagging of prior results with similar values performed with this method. Interpretation of those prior results can be made in the context of the updated reference intervals.    ALT 15 0 - 50 U/L      Comment:      Reference intervals for this test were updated on 6/12/2023 to more accurately reflect our healthy population. There may be differences in the flagging of prior results with similar values performed with this method. Interpretation of those prior results can be made in the context of the updated reference intervals.      Protein  Total 6.7 6.4 - 8.3 g/dL    Albumin 4.2 3.5 - 5.2 g/dL    Bilirubin Total 0.3 <=1.2 mg/dL   Hemoglobin A1c   Result Value Ref Range    Hemoglobin A1C 6.0 (H) 0.0 - 5.6 %      Comment:      Normal <5.7%   Prediabetes 5.7-6.4%    Diabetes 6.5% or higher     Note: Adopted from ADA consensus guidelines.   IRON AND IRON BINDING CAPACITY   Result Value Ref Range    Iron 80 37 - 145 ug/dL    Iron Binding Capacity 243 240 - 430 ug/dL    Iron Sat Index 33 15 - 46 %   FERRITIN   Result Value Ref Range    Ferritin 101 11 - 328 ng/mL

## 2023-10-16 ENCOUNTER — THERAPY VISIT (OUTPATIENT)
Dept: PHYSICAL THERAPY | Facility: CLINIC | Age: 63
End: 2023-10-16
Payer: COMMERCIAL

## 2023-10-16 DIAGNOSIS — M25.562 LEFT KNEE PAIN, UNSPECIFIED CHRONICITY: ICD-10-CM

## 2023-10-16 PROCEDURE — 97161 PT EVAL LOW COMPLEX 20 MIN: CPT | Mod: GP | Performed by: PHYSICAL THERAPIST

## 2023-10-16 PROCEDURE — 97110 THERAPEUTIC EXERCISES: CPT | Mod: GP | Performed by: PHYSICAL THERAPIST

## 2023-10-16 NOTE — PROGRESS NOTES
"PHYSICAL THERAPY EVALUATION  Type of Visit: Evaluation    See electronic medical record for Abuse and Falls Screening details.    Subjective       Presenting condition or subjective complaint:  \"My left knee is paining me so much. I danced on it may have torn something. I asked to see an orthopedic\"  Patient presents with Left knee pain that began in the last several months.  She danced and the next morning she had knee pain.  She managed for a long time feeling it would heal and then she danced again and now is having a great deal more pain.  She feels a band of pain all around the left knee.  She doesn't feel it strong enough to support her.  If she sleeps on the Left she had more pain, standing and walking after sitting for a while is extremely painful.  Walking improves her symptoms.  3 weeks ago she was on a flight and could not get up after the flight requiring a wheel chair to get out of the airport.  Patient is unable to wear heels. Patient tried her exercises for her R torn meniscus from a previous bout of exercise.  She would like to get back to dancing. She did some dancing for 1 song where she lightly bounced in place and did not move the leg and was incredibly sore afterwards.   She wonders if it was the ivone cross jump from \"jaclyn jaclyn slide\" or the deep squat from \"Victim\" dance.   Date of onset: 09/01/23    Relevant medical history: Asthma   Dates & types of surgery: Ruptured appendix 1995 or 1996    Prior diagnostic imaging/testing results:   None   Prior therapy history for the same diagnosis, illness or injury: No      Prior Level of Function  Transfers: Independent  Ambulation: Independent  ADL: Independent    Living Environment  Social support: Alone   Type of home: Apartment/condo; 2-story   Stairs to enter the home: Yes 0 (2 full flights)     Ramp: No   Stairs inside the home: No       Help at home: None  Equipment owned:       Employment: Yes work from home  Hobbies/Interests: " Dancing    Patient goals for therapy: Get up from a chair W/O a limp, wear heels, sleep without getting up in pain, bend knee without pain.       Objective   KNEE EVALUATION  PAIN: Pain Level at Rest: 0/10  Pain Level with Use: 8/10  INTEGUMENTARY (edema, incisions): WNL  POSTURE: Standing Posture: Rounded shoulders, Forward head  Sitting Posture: Rounded shoulders, Forward head  GAIT:  Gait Deviations:  Severe antalgic gait pattern during L stance phase of gait that improves somewhat with increased time spent on feet. Patient furniture walks to initially get going.   ROM:   (Degrees) Left AROM Left PROM  Right AROM Right PROM   Knee Flexion 100 with severe pain  110    Knee Extension 0 degrees no pain  0 degrees no pain     STRENGTH:   Pain: - none + mild ++ moderate +++ severe  Strength Scale: 0-5/5 Left Right   Knee Flexion 4+ 5   Knee Extension 5- 5   Quad Set Fair Good   SPECIAL TESTS: Positive McMurrays, Apply, and medial/lateral joint line tenderness on Left knee.  All ligamentous testing WNL and pain free B. All meniscus testing negative on R.   FUNCTIONAL TESTS:  sit to stand: Use of BUE's to stand with significant weight shift to the R offloading left.  Patient demonstrates signs of significant pain using multiple attempts to rise, with wincing, and verbal reports of distress.   PALPATION:  joint line tenderness along medial and lateral   JOINT MOBILITY:  knee cap WNL. Could not assess tibiofemoral mobility due to pain this date.     Assessment & Plan   CLINICAL IMPRESSIONS  Medical Diagnosis: Left knee pain, unspecified chronicity    Treatment Diagnosis: Left knee pain, unspecified chronicity   Impression/Assessment: Patient is a 63 year old female with Left Knee complaints.  The following significant findings have been identified: Pain, Decreased ROM/flexibility, Decreased strength, Impaired balance, Decreased proprioception, Inflammation, Impaired gait, Impaired muscle performance, and Decreased  activity tolerance. These impairments interfere with their ability to perform self care tasks, work tasks, recreational activities, household chores, driving , household mobility, and community mobility as compared to previous level of function.     Clinical Decision Making (Complexity):  Clinical Presentation: Stable/Uncomplicated  Clinical Presentation Rationale: based on medical and personal factors listed in PT evaluation  Clinical Decision Making (Complexity): Low complexity    PLAN OF CARE  Treatment Interventions:  Modalities: Cryotherapy, Dry Needling, E-stim, Hot Pack, Iontophoresis, Laser Light, Ultrasound, Vasoneumatic Device  Interventions: Gait Training, Manual Therapy, Neuromuscular Re-education, Therapeutic Activity, Therapeutic Exercise, Self-Care/Home Management    Long Term Goals     PT Goal 1  Goal Identifier: Ambulation  Goal Description: Patient will ambulate for 30 minutes without pain  Rationale: to maximize safety and independence with performance of ADLs and functional tasks;to maximize safety and independence within the home;to maximize safety and independence within the community;to maximize safety and independence with transportation;to maximize safety and independence with self cares  Target Date: 12/11/23      Frequency of Treatment: 1 time per week  Duration of Treatment: 8 weeks    Recommended Referrals to Other Professionals:  Orthopedic MD  Education Assessment:   Learner/Method: Patient;Listening;Demonstration;Reading;Pictures/Video;No Barriers to Learning  Education Comments: Educated on findings of exam and likely frequency/duration of PT POC.    Risks and benefits of evaluation/treatment have been explained.   Patient/Family/caregiver agrees with Plan of Care.     Evaluation Time:     PT Eval, Low Complexity Minutes (93488): 25     Signing Clinician: Aarti Patel, PT, DPT, CMT, OCS

## 2023-10-17 ENCOUNTER — TELEPHONE (OUTPATIENT)
Dept: PEDIATRICS | Facility: CLINIC | Age: 63
End: 2023-10-17
Payer: COMMERCIAL

## 2023-10-17 DIAGNOSIS — M25.562 LEFT KNEE PAIN, UNSPECIFIED CHRONICITY: Primary | ICD-10-CM

## 2023-10-17 ASSESSMENT — ACTIVITIES OF DAILY LIVING (ADL)
SIT WITH YOUR KNEE BENT: ACTIVITY IS VERY DIFFICULT
SWELLING: I HAVE THE SYMPTOM BUT IT DOES NOT AFFECT MY ACTIVITY
KNEE_ACTIVITY_OF_DAILY_LIVING_SUM: 16
RISE FROM A CHAIR: ACTIVITY IS VERY DIFFICULT
RAW_SCORE: 16
KNEEL ON THE FRONT OF YOUR KNEE: I AM UNABLE TO DO THE ACTIVITY
WEAKNESS: THE SYMPTOM AFFECTS MY ACTIVITY SEVERELY
STAND: ACTIVITY IS VERY DIFFICULT
HOW_WOULD_YOU_RATE_THE_OVERALL_FUNCTION_OF_YOUR_KNEE_DURING_YOUR_USUAL_DAILY_ACTIVITIES?: ABNORMAL
PAIN: THE SYMPTOM AFFECTS MY ACTIVITY SEVERELY
AS_A_RESULT_OF_YOUR_KNEE_INJURY,_HOW_WOULD_YOU_RATE_YOUR_CURRENT_LEVEL_OF_DAILY_ACTIVITY?: ABNORMAL
SQUAT: ACTIVITY IS FAIRLY DIFFICULT
STIFFNESS: THE SYMPTOM PREVENTS ME FROM ALL DAILY ACTIVITIES
KNEE_ACTIVITY_OF_DAILY_LIVING_SCORE: 22.86
LIMPING: THE SYMPTOM PREVENTS ME FROM ALL DAILY ACTIVITIES
GIVING WAY, BUCKLING OR SHIFTING OF KNEE: THE SYMPTOM AFFECTS MY ACTIVITY SEVERELY
GO DOWN STAIRS: ACTIVITY IS FAIRLY DIFFICULT
GO UP STAIRS: ACTIVITY IS VERY DIFFICULT
HOW_WOULD_YOU_RATE_THE_CURRENT_FUNCTION_OF_YOUR_KNEE_DURING_YOUR_USUAL_DAILY_ACTIVITIES_ON_A_SCALE_FROM_0_TO_100_WITH_100_BEING_YOUR_LEVEL_OF_KNEE_FUNCTION_PRIOR_TO_YOUR_INJURY_AND_0_BEING_THE_INABILITY_TO_PERFORM_ANY_OF_YOUR_USUAL_DAILY_ACTIVITIES?: 50
WALK: ACTIVITY IS VERY DIFFICULT

## 2023-10-17 NOTE — TELEPHONE ENCOUNTER
----- Message from Aarti Patel PT sent at 10/16/2023  5:15 PM CDT -----  Regarding: Orthopedics Consult Request  Hello Dr. Elena,    I evaluated Willy today for her left knee pain and do to the severity of her symptoms and positive: Tiff's, Appley's, and medial/lateral joint line tenderness, I think I would be best to have her visit an Orthopedic MD for further evaluation and additional symptom management. We did not assess Thessaly's test due to patients level of irritability. She was very limited in her mobility/exercise ability during her evaluation this date.     If you agree with this course of action could you send over an order for orthopedic consult?    Please reach out with any questions you have regarding Willy's exam today.     Aarti Patel, PT, DPT, CMT, OCS

## 2023-10-18 ENCOUNTER — OFFICE VISIT (OUTPATIENT)
Dept: ORTHOPEDICS | Facility: CLINIC | Age: 63
End: 2023-10-18
Attending: INTERNAL MEDICINE
Payer: COMMERCIAL

## 2023-10-18 ENCOUNTER — ANCILLARY PROCEDURE (OUTPATIENT)
Dept: GENERAL RADIOLOGY | Facility: CLINIC | Age: 63
End: 2023-10-18
Attending: FAMILY MEDICINE
Payer: COMMERCIAL

## 2023-10-18 VITALS — WEIGHT: 268 LBS | BODY MASS INDEX: 38.37 KG/M2 | HEIGHT: 70 IN

## 2023-10-18 DIAGNOSIS — M25.562 ACUTE PAIN OF LEFT KNEE: Primary | ICD-10-CM

## 2023-10-18 DIAGNOSIS — M17.0 PRIMARY OSTEOARTHRITIS OF BOTH KNEES: ICD-10-CM

## 2023-10-18 DIAGNOSIS — M25.562 LEFT KNEE PAIN, UNSPECIFIED CHRONICITY: ICD-10-CM

## 2023-10-18 PROCEDURE — 73560 X-RAY EXAM OF KNEE 1 OR 2: CPT | Mod: TC | Performed by: RADIOLOGY

## 2023-10-18 PROCEDURE — 73562 X-RAY EXAM OF KNEE 3: CPT | Mod: TC | Performed by: RADIOLOGY

## 2023-10-18 PROCEDURE — 99203 OFFICE O/P NEW LOW 30 MIN: CPT | Performed by: FAMILY MEDICINE

## 2023-10-18 ASSESSMENT — PAIN SCALES - GENERAL: PAINLEVEL: MILD PAIN (2)

## 2023-10-18 NOTE — LETTER
"    10/18/2023         RE: Willy Aquino  [address]  [facility] [address]        Dear Colleague,    Thank you for referring your patient, Willy Aquino, to the [facility] [place]. Please see a copy of my visit note below.    CHIEF COMPLAINT:  Pain of the Left Knee (L knee pain for 6 weeks)     HISTORY OF PRESENT ILLNESS  Ms. Aquino is a pleasant 63 year old year old female who presents to clinic today with chronic L knee pain that she's experienced for 2months.  Willy explains that she was dancing two months ago \"getting down\" and woke up with L knee pain. Pain subsided and two weeks later at another event she was doing the \"jaclyn jaclyn slide\" and the pain began again. History of R knee meniscal tear in 2015 and she reports that this pain feels very similar.     Onset: gradual  Location: left knee over the medial and lateral joint line  Quality:  sharp, shooting, throbing, and posterior pressure  Duration: 6+ weeks  Severity: 9/10 at worst  Timing:intermittent episodes daily  Modifying factors:  resting and non-use makes it better, movement and use makes it worse; walking improves the pain  Associated signs & symptoms: none  Previous similar pain: Yes, in the R knee in 2015  Treatments to date:PCP ordered PT with first visit being 10/16/23    Additional history: as documented    Review of Systems:  Have you recently had a a fever, chills, weight loss? No  Do you have any vision problems? No  Do you have any chest pain or edema? No  Do you have any shortness of breath or wheezing?  No  Do you have stomach problems? No  Do you have any numbness or focal weakness? No  Do you have diabetes? No  Do you have problems with bleeding or clotting? No  Do you have an rashes or other skin lesions? No    MEDICAL HISTORY  Patient Active Problem List   Diagnosis     Atypical face pain     Headache     Disorder of jaw     TMJ (temporomandibular joint syndrome)     Anemia     Asthma     Wears " "glasses     Snores     Bruxism     Right knee pain     Left knee pain, unspecified chronicity       Current Outpatient Medications   Medication Sig Dispense Refill     Cholecalciferol (VITAMIN D) 1000 UNITS capsule Take 1 capsule by mouth daily.       Ferrous Sulfate (IRON) 325 (65 Fe) MG tablet Take 1 tablet by mouth daily (with breakfast) for 360 days 90 tablet 3     fluticasone-salmeterol (ADVAIR DISKUS) 500-50 MCG/DOSE diskus inhaler Inhale 1 puff into the lungs every 12 hours.       fluticasone-salmeterol (ADVAIR) 500-50 MCG/ACT inhaler Inhale 1 puff into the lungs every 12 hours 1 each 0     Multiple Vitamin (MULTI-VITAMIN) per tablet Take 1 tablet by mouth daily.       Omega-3 Fatty Acids (FISH OIL PO) Take  by mouth. (Patient not taking: Reported on 9/28/2023)         Allergies   Allergen Reactions     Chloroquine Hives       No family history on file.    Additional medical/Social/Surgical histories reviewed in [facility] and updated as appropriate.       PHYSICAL EXAM  Ht 1.778 m (5' 10\")   Wt 121.6 kg (268 lb)   BMI 38.45 kg/m      General  - normal appearance, in no obvious distress  Musculoskeletal -left knee  - stance: mildly antalgic gait favoring affected side  Inspection: 1+ effusion, normal muscle tone, normal bone and joint alignment  Palpation: tender along medial aspect of the knee as well as medial joint line.  ROM: 135 degrees flexion, 0 degrees extension, painful passive flexion terminally  Strength: 5/5 in flexion, 5/5 in extension  Special Tests:  (-) Lachman  (-) anterior drawer  (-) posterior drawer  (-) Tiff  (-) varus at 0 and 30 degrees flexion  (+) valgus stress at 30 degrees flexion, no laxity at 0 degrees    Neuro  - no sensory or motor deficit, grossly normal coordination, normal muscle tone    IMAGING : Left knee xray 3 views.  Final results and radiologist's interpretation, available in the [facility] health record. Images were reviewed with the patient/family members in the office " today. My personal interpretation of the performed imaging is moderate osteoarthritis of the lateral compartment of right knee, at least mild medial compartment narrowing of left knee and osteophytosis of the lateral compartment of the left knee.     ASSESSMENT & PLAN  Ms. Aquino is a 63 year old year old female who presents to clinic today with acute pain and swelling of left knee without improvement over the last 6 weeks.    Concern at this time for possible degenerative meniscus tear, query MCL injury, and discussed underlying osteoarthritis which may have also been exacerbated.    Further diagnostic as well as treatment measures discussed.  Discussed considering corticosteroid injection, physical therapy as well as possible additional imaging.  At this time Willy is most interested in pursuing an MRI for knee and I did oblige this request.    Diagnosis:   Acute pain of left knee  Primary osteoarthritis of left knee    -MRI of left knee without contrast  -Discussed consideration for possible injection and, possible bracing as well as physical therapy for nonsurgical treatment options  -Ice,oral analgesics, rest, elevation  -May continue with her home exercise program but for now she can hold back on additional PT visits until MRI has returned  -Follow up; we will discuss via MyChart regarding results.    It was a pleasure seeing Willy today.    Tom Horn DO, [facility]  Primary Care Sports Medicine       Again, thank you for allowing me to participate in the care of your patient.        Sincerely,        Tom Horn DO

## 2023-10-18 NOTE — PROGRESS NOTES
"CHIEF COMPLAINT:  Pain of the Left Knee (L knee pain for 6 weeks)     HISTORY OF PRESENT ILLNESS  Ms. Aquino is a pleasant 63 year old year old female who presents to clinic today with chronic L knee pain that she's experienced for 2months.  Willy explains that she was dancing two months ago \"getting down\" and woke up with L knee pain. Pain subsided and two weeks later at another event she was doing the \"jaclyn jaclyn slide\" and the pain began again. History of R knee meniscal tear in 2015 and she reports that this pain feels very similar.     Onset: gradual  Location: left knee over the medial and lateral joint line  Quality:  sharp, shooting, throbing, and posterior pressure  Duration: 6+ weeks  Severity: 9/10 at worst  Timing:intermittent episodes daily  Modifying factors:  resting and non-use makes it better, movement and use makes it worse; walking improves the pain  Associated signs & symptoms: none  Previous similar pain: Yes, in the R knee in 2015  Treatments to date:PCP ordered PT with first visit being 10/16/23    Additional history: as documented    Review of Systems:  Have you recently had a a fever, chills, weight loss? No  Do you have any vision problems? No  Do you have any chest pain or edema? No  Do you have any shortness of breath or wheezing?  No  Do you have stomach problems? No  Do you have any numbness or focal weakness? No  Do you have diabetes? No  Do you have problems with bleeding or clotting? No  Do you have an rashes or other skin lesions? No    MEDICAL HISTORY  Patient Active Problem List   Diagnosis    Atypical face pain    Headache    Disorder of jaw    TMJ (temporomandibular joint syndrome)    Anemia    Asthma    Wears glasses    Snores    Bruxism    Right knee pain    Left knee pain, unspecified chronicity       Current Outpatient Medications   Medication Sig Dispense Refill    Cholecalciferol (VITAMIN D) 1000 UNITS capsule Take 1 capsule by mouth daily.      Ferrous Sulfate (IRON) 325 " "(65 Fe) MG tablet Take 1 tablet by mouth daily (with breakfast) for 360 days 90 tablet 3    fluticasone-salmeterol (ADVAIR DISKUS) 500-50 MCG/DOSE diskus inhaler Inhale 1 puff into the lungs every 12 hours.      fluticasone-salmeterol (ADVAIR) 500-50 MCG/ACT inhaler Inhale 1 puff into the lungs every 12 hours 1 each 0    Multiple Vitamin (MULTI-VITAMIN) per tablet Take 1 tablet by mouth daily.      Omega-3 Fatty Acids (FISH OIL PO) Take  by mouth. (Patient not taking: Reported on 9/28/2023)         Allergies   Allergen Reactions    Chloroquine Hives       No family history on file.    Additional medical/Social/Surgical histories reviewed in [facility] and updated as appropriate.       PHYSICAL EXAM  Ht 1.778 m (5' 10\")   Wt 121.6 kg (268 lb)   BMI 38.45 kg/m      General  - normal appearance, in no obvious distress  Musculoskeletal -left knee  - stance: mildly antalgic gait favoring affected side  Inspection: 1+ effusion, normal muscle tone, normal bone and joint alignment  Palpation: tender along medial aspect of the knee as well as medial joint line.  ROM: 135 degrees flexion, 0 degrees extension, painful passive flexion terminally  Strength: 5/5 in flexion, 5/5 in extension  Special Tests:  (-) Lachman  (-) anterior drawer  (-) posterior drawer  (-) Tiff  (-) varus at 0 and 30 degrees flexion  (+) valgus stress at 30 degrees flexion, no laxity at 0 degrees    Neuro  - no sensory or motor deficit, grossly normal coordination, normal muscle tone    IMAGING : Left knee xray 3 views.  Final results and radiologist's interpretation, available in the [facility] health record. Images were reviewed with the patient/family members in the office today. My personal interpretation of the performed imaging is moderate osteoarthritis of the lateral compartment of right knee, at least mild medial compartment narrowing of left knee and osteophytosis of the lateral compartment of the left knee.     ASSESSMENT & PLAN  Ms. Aquino is a " 63 year old year old female who presents to clinic today with acute pain and swelling of left knee without improvement over the last 6 weeks.    Concern at this time for possible degenerative meniscus tear, query MCL injury, and discussed underlying osteoarthritis which may have also been exacerbated.    Further diagnostic as well as treatment measures discussed.  Discussed considering corticosteroid injection, physical therapy as well as possible additional imaging.  At this time Willy is most interested in pursuing an MRI for knee and I did oblige this request.    Diagnosis:   Acute pain of left knee  Primary osteoarthritis of left knee    -MRI of left knee without contrast  -Discussed consideration for possible injection and, possible bracing as well as physical therapy for nonsurgical treatment options  -Ice,oral analgesics, rest, elevation  -May continue with her home exercise program but for now she can hold back on additional PT visits until MRI has returned  -Follow up; we will discuss via MyChart regarding results.    It was a pleasure seeing Willy today.    Tom Horn DO, CAQSM  Primary Care Sports Medicine

## 2023-10-18 NOTE — PATIENT INSTRUCTIONS
Thank you for choosing [facility]    [facility] LOCATIONS  [place] [address] [facility], [address]th [place]   [place], [place], [address] [place] [address]   [phone]       APPOINTMENTS: [phone]    CARE QUESTIONS: [phone],    BILLING QUESTIONS: [phone]    FAX NUMBER: [phone]        Follow up: Dr. Horn will send you a Pressly message with your MRI results for next steps.      1. Acute pain of left knee         An order for an MRI was placed today. You may call directly to schedule at [phone] at your convenience.

## 2023-10-19 ENCOUNTER — HOSPITAL ENCOUNTER (OUTPATIENT)
Dept: MRI IMAGING | Facility: CLINIC | Age: 63
Discharge: HOME | End: 2023-10-19
Attending: FAMILY MEDICINE | Admitting: FAMILY MEDICINE
Payer: COMMERCIAL

## 2023-10-19 DIAGNOSIS — M25.562 ACUTE PAIN OF LEFT KNEE: ICD-10-CM

## 2023-10-19 PROCEDURE — 73721 MRI JNT OF LWR EXTRE W/O DYE: CPT | Mod: LT

## 2023-10-19 PROCEDURE — 73721 MRI JNT OF LWR EXTRE W/O DYE: CPT | Mod: 26 | Performed by: RADIOLOGY

## 2023-10-25 ENCOUNTER — TELEPHONE (OUTPATIENT)
Dept: GASTROENTEROLOGY | Facility: CLINIC | Age: 63
End: 2023-10-25
Payer: COMMERCIAL

## 2023-10-25 NOTE — TELEPHONE ENCOUNTER
"Endoscopy Scheduling Screen    Have you had a positive Covid test in the last 14 days?  No    Are you active on MyChart?   No    What insurance is in the chart?  Other:      Ordering/Referring Provider:     LIAYH NEWELL      (If ordering provider performs procedure, schedule with ordering provider unless otherwise instructed. )    BMI: Estimated body mass index is 38.45 kg/m  as calculated from the following:    Height as of 10/18/23: 1.778 m (5' 10\").    Weight as of 10/18/23: 121.6 kg (268 lb).     Sedation Ordered  moderate sedation.   If patient BMI > 50 do not schedule in [facility].    If patient BMI > 45 do not schedule at [facility].    Are you taking methadone or Suboxone?  No    Are you taking any prescription medications for pain 3 or more times per week?   No    Do you have a history of malignant hyperthermia or adverse reaction to anesthesia?  No    (Females) Are you currently pregnant?   No     Have you been diagnosed or told you have pulmonary hypertension?   No    Do you have an LVAD?  No    Have you been told you have moderate to severe sleep apnea?  No    Have you been told you have COPD, asthma, or any other lung disease?  Yes     What breathing problems do you have?  Asthma     Do you use home oxygen?  No    Have your breathing problems required an ED visit or hospitalization in the last year?  No    Do you have any heart conditions?  No     Have you ever had an organ transplant?   No    Have you ever had or are you awaiting a heart or lung transplant?   No    Have you had a stroke or transient ischemic attack (TIA aka \"mini stroke\" in the last 6 months?   No    Have you been diagnosed with or been told you have cirrhosis of the liver?   No    Are you currently on dialysis?   No    Do you need assistance transferring?   No    BMI: Estimated body mass index is 38.45 kg/m  as calculated from the following:    Height as of 10/18/23: 1.778 m (5' 10\").    Weight as of 10/18/23: 121.6 kg (268 lb).     Is " patients BMI > 40 and scheduling location [facility]?  No    Do you take an injectable medication for weight loss or diabetes (excluding insulin)?  No    Do you take the medication Naltrexone?  No    Do you take blood thinners?  No       Prep   Are you currently on dialysis or do you have chronic kidney disease?  No    Do you have a diagnosis of diabetes?  No    Do you have a diagnosis of cystic fibrosis (CF)?  No    On a regular basis do you go 3 -5 days between bowel movements?  No    BMI > 40?  No    Preferred Pharmacy:    [id number] - [place] - [address]  [address] [place] [address]  Phone: [phone] Fax: [phone]      Final Scheduling Details   Colonoscopy prep sent?  Standard MiraLAX    Procedure scheduled  Colonoscopy    Surgeon:  ARUNA     Date of procedure:  02/12/2024     Pre-OP / PAC:   No - Not required for this site.    Location  [facility] - Per order.    Sedation   Moderate Sedation - Per order.      Patient Reminders:   You will receive a call from a Nurse to review instructions and health history.  This assessment must be completed prior to your procedure.  Failure to complete the Nurse assessment may result in the procedure being cancelled.      On the day of your procedure, please designate an adult(s) who can drive you home stay with you for the next 24 hours. The medicines used in the exam will make you sleepy. You will not be able to drive.      You cannot take public transportation, ride share services, or non-medical taxi service without a responsible caregiver.  Medical transport services are allowed with the requirement that a responsible caregiver will receive you at your destination.  We require that drivers and caregivers are confirmed prior to your procedure.

## 2023-10-29 ENCOUNTER — HOSPITAL ENCOUNTER (EMERGENCY)
Facility: CLINIC | Age: 63
Discharge: HOME | End: 2023-10-29
Attending: EMERGENCY MEDICINE | Admitting: EMERGENCY MEDICINE
Payer: COMMERCIAL

## 2023-10-29 ENCOUNTER — APPOINTMENT (OUTPATIENT)
Dept: GENERAL RADIOLOGY | Facility: CLINIC | Age: 63
End: 2023-10-29
Attending: EMERGENCY MEDICINE
Payer: COMMERCIAL

## 2023-10-29 VITALS
TEMPERATURE: 98.2 F | HEART RATE: 66 BPM | SYSTOLIC BLOOD PRESSURE: 128 MMHG | DIASTOLIC BLOOD PRESSURE: 105 MMHG | WEIGHT: 268 LBS | HEIGHT: 70 IN | RESPIRATION RATE: 16 BRPM | BODY MASS INDEX: 38.37 KG/M2 | OXYGEN SATURATION: 98 %
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
## 2023-10-29 DIAGNOSIS — M25.552 HIP PAIN, LEFT: ICD-10-CM

## 2023-10-29 DIAGNOSIS — H92.02 LEFT EAR PAIN: ICD-10-CM

## 2023-10-29 PROCEDURE — 73502 X-RAY EXAM HIP UNI 2-3 VIEWS: CPT

## 2023-10-29 PROCEDURE — 99283 EMERGENCY DEPT VISIT LOW MDM: CPT

## 2023-10-29 ASSESSMENT — ACTIVITIES OF DAILY LIVING (ADL)
ADLS_ACUITY_SCORE: 35
ADLS_ACUITY_SCORE: 35
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
## 2023-10-29 NOTE — ED NOTES
"Patient declines XR.  Requests CT scan.  States  \"we should do a CT scan of everything because 1 time in my knee an XR missed something\".  Dr Uriostegui aware of patient request.   "

## 2023-10-29 NOTE — ED NOTES
Patient states left ear pain, pressure.  Denies neck pain, head pain.  Denies chest pain, back pain.  No SOB.  Complaints of tingling to left lateral hip.  Moving lower extremities without difficulty.
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
## 2023-11-01 ENCOUNTER — THERAPY VISIT (OUTPATIENT)
Dept: PHYSICAL THERAPY | Facility: CLINIC | Age: 63
End: 2023-11-01
Payer: COMMERCIAL

## 2023-11-01 DIAGNOSIS — M25.562 LEFT KNEE PAIN, UNSPECIFIED CHRONICITY: Primary | ICD-10-CM

## 2023-11-01 PROCEDURE — 97140 MANUAL THERAPY 1/> REGIONS: CPT | Mod: GP | Performed by: PHYSICAL THERAPIST

## 2023-11-01 PROCEDURE — 97110 THERAPEUTIC EXERCISES: CPT | Mod: GP | Performed by: PHYSICAL THERAPIST

## 2023-11-07 ENCOUNTER — THERAPY VISIT (OUTPATIENT)
Dept: PHYSICAL THERAPY | Facility: CLINIC | Age: 63
End: 2023-11-07
Payer: COMMERCIAL

## 2023-11-07 DIAGNOSIS — M25.562 LEFT KNEE PAIN, UNSPECIFIED CHRONICITY: Primary | ICD-10-CM

## 2023-11-07 PROCEDURE — 97110 THERAPEUTIC EXERCISES: CPT | Mod: GP | Performed by: PHYSICAL THERAPIST

## 2023-11-10 ENCOUNTER — TELEPHONE (OUTPATIENT)
Dept: ORTHOPEDICS | Facility: CLINIC | Age: 63
End: 2023-11-10
Payer: COMMERCIAL

## 2023-11-10 NOTE — TELEPHONE ENCOUNTER
Patient Returning Call    Reason for call:  pt is calling to talk to provider about MRI and wanting to know what to do next. Requesting callback    Information relayed to patient:  te sent to clinic     Patient has additional questions:  No      Okay to leave a detailed message?: Yes at Cell number on file:    Telephone Information:   Mobile [phone]

## 2023-11-10 NOTE — TELEPHONE ENCOUNTER
Please see patients TE and advise on MR. Patient last seen 10/18/23. Per chart review plan     MRI of left knee without contrast  -Discussed consideration for possible injection and, possible bracing as well as physical therapy for nonsurgical treatment options  -Ice,oral analgesics, rest, elevation  -May continue with her home exercise program but for now she can hold back on additional PT visits until MRI has returned  -Follow up; we will discuss via MyChart regarding results.    Please advise on MRI results     Narrative & Impression   MR left knee without contrast 10/19/2023 12:35 PM     Techniques: Multiplanar multisequence imaging of the left knee was  obtained without administration of intra-articular or intravenous  contrast using routing protocol.     History: Acute pain of left knee     Comparison: 10/18/2023     Findings:     Motion degrading images on the axial fat suppressed fluid sensitive  sequence.     MENISCI:  Medial meniscus: Partial tear posterior root ligament of the medial  meniscus with associated subcortical edema like marrow signal  intensity, horizontal tear extension into the posterior horn and body,  and mild peripheral extrusion of meniscal body.  Lateral meniscus: Horizontal tear anterior horn, body and posterior  horn of lateral meniscus extending into the root ligaments with  associated subcortical cystlike change/edema like marrow signal  intensity at the lateral tibial spine.     LIGAMENTS  Cruciate ligaments: Intact.  Medial supporting structures: Mild thickening of tibial collateral  ligament. Focal defect tibial collateral ligament/posterior oblique  remaining junction just above the level of the knee joint. Otherwise  intact.  Lateral supporting structures: Intact.     EXTENSOR MECHANISM  Intact.     FLUID  Small joint effusion. Small Baker's cyst, containing internal debris.     OSSEOUS and ARTICULAR STRUCTURES  Bones: No fracture, contusion, or osseous lesion is seen. Edema  like  marrow signal intensity peripheral aspect of medial tibial plateau,  likely reactive to underlying meniscal pathology. Osteophytosis.     Patellofemoral compartment: Areas of full-thickness chondral loss and  fissuring with subchondral cystlike change/edema like marrow signal  intensity at the patellar median ridge. Hypointense signal central  trochlea likely representing full-thickness chondral fissure present.     Medial compartment: Full-thickness chondral loss posterior  weightbearing surface of the medial femoral condyle with subchondral  edema, significant density.     Lateral compartment: No hyaline cartilage disease.     ANCILLARY FINDINGS  Mild subcutaneous edema especially anteriorly and medially.                                                                      Impression:  1. Partial tear posterior root ligament of the medial meniscus with  horizontal tear extension into the posterior horn and body.  2. Horizontal tear anterior horn, body and posterior horn of lateral  meniscus extending into the root ligaments.  3. 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     AIRAM Johnson ATC

## 2023-11-11 NOTE — TELEPHONE ENCOUNTER
"Upon chart review provider sent patient a results message via [employer], however, patient does not have [employer] account so staff will need to call with results.     Message sent by Dr. Horn states:     \"The MRI does confirm tears of the meniscus as well as full thickness chondral loss posterior  weightbearing surface of the medial knee bone.  I would still plan for physical therapy and +/- cortisone injection.  If these measures above did not help, at that point we could determine whether a surgery could help.     Based on your past recovery with meniscus, I am hopeful you can work through this!\"    Clinical staff to contact patient to relay above message. Routing to provider as FYI- no action needed from provider at this time.      Celi Montelongo MSA, ATC  Certified Athletic Trainer   "

## 2023-11-13 DIAGNOSIS — M17.12 PRIMARY OSTEOARTHRITIS OF LEFT KNEE: Primary | ICD-10-CM

## 2023-11-13 NOTE — TELEPHONE ENCOUNTER
Per Dr Horn, called to let patient know Dr Horn put in an orthotic order for her to get fitted for a brace.     Called and spoke to patient to inform them that the orthotic order was in and they can call to schedule an appointment. They opted for the [place] location as they live in [place]. The orthotic scheduling number was provided [phone].    Patient verbalized understanding and will call to schedule.     Stephanie Boyer, CHRISTA, LAT, ATC  Certified Athletic Trainer

## 2023-11-13 NOTE — TELEPHONE ENCOUNTER
Called patient back and spoke to her. Relayed the MRI results listed below. Patient was encouraged to continue physical therapy and conservative methods in hopes that she heals. Offered an appointment for corticosteroid injection but patient declined for now. She will see how PT goes and will call to schedule if she changes her mind or if her pain increases.    Patient was very appreciative of call and agreeable with the plan.    Patient also inquired about a brace. Writer told patient they will inquire about a brace and update her once we have more information.    Please advise if a knee brace is appropriate for patient and sign order if so. Order pended.    Stephanie Boyer, CHRISTA, LAT, ATC  Certified Athletic Trainer

## 2023-11-13 NOTE — TELEPHONE ENCOUNTER
No consent to communicate on file. LVM requesting a call back and central scheduling number was provided.    Stephanie Boyer, CHRISTA, LAT, ATC  Certified Athletic Trainer

## 2023-11-16 ENCOUNTER — THERAPY VISIT (OUTPATIENT)
Dept: PHYSICAL THERAPY | Facility: CLINIC | Age: 63
End: 2023-11-16
Payer: COMMERCIAL

## 2023-11-16 DIAGNOSIS — M25.562 LEFT KNEE PAIN, UNSPECIFIED CHRONICITY: Primary | ICD-10-CM

## 2023-11-16 PROCEDURE — 97110 THERAPEUTIC EXERCISES: CPT | Mod: GP | Performed by: PHYSICAL THERAPIST

## 2023-12-07 ENCOUNTER — THERAPY VISIT (OUTPATIENT)
Dept: PHYSICAL THERAPY | Facility: CLINIC | Age: 63
End: 2023-12-07
Payer: COMMERCIAL

## 2023-12-07 DIAGNOSIS — M25.562 LEFT KNEE PAIN, UNSPECIFIED CHRONICITY: Primary | ICD-10-CM

## 2023-12-07 PROCEDURE — 97110 THERAPEUTIC EXERCISES: CPT | Mod: GP | Performed by: PHYSICAL THERAPIST

## 2023-12-14 ENCOUNTER — THERAPY VISIT (OUTPATIENT)
Dept: PHYSICAL THERAPY | Facility: CLINIC | Age: 63
End: 2023-12-14
Payer: COMMERCIAL

## 2023-12-14 DIAGNOSIS — M25.562 LEFT KNEE PAIN, UNSPECIFIED CHRONICITY: Primary | ICD-10-CM

## 2023-12-14 PROCEDURE — 97110 THERAPEUTIC EXERCISES: CPT | Mod: GP | Performed by: PHYSICAL THERAPIST

## 2023-12-14 PROCEDURE — 97112 NEUROMUSCULAR REEDUCATION: CPT | Mod: GP | Performed by: PHYSICAL THERAPIST

## 2023-12-21 ENCOUNTER — THERAPY VISIT (OUTPATIENT)
Dept: PHYSICAL THERAPY | Facility: CLINIC | Age: 63
End: 2023-12-21
Payer: COMMERCIAL

## 2023-12-21 DIAGNOSIS — M25.562 LEFT KNEE PAIN, UNSPECIFIED CHRONICITY: Primary | ICD-10-CM

## 2023-12-21 PROCEDURE — 97110 THERAPEUTIC EXERCISES: CPT | Mod: GP | Performed by: PHYSICAL THERAPIST
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
## 2024-01-11 ENCOUNTER — OFFICE VISIT (OUTPATIENT)
Dept: ORTHOPEDICS | Facility: CLINIC | Age: 64
End: 2024-01-11
Payer: COMMERCIAL

## 2024-01-11 VITALS
SYSTOLIC BLOOD PRESSURE: 129 MMHG | DIASTOLIC BLOOD PRESSURE: 85 MMHG | HEIGHT: 70 IN | WEIGHT: 272 LBS | BODY MASS INDEX: 38.94 KG/M2

## 2024-01-11 DIAGNOSIS — M17.12 PRIMARY OSTEOARTHRITIS OF LEFT KNEE: Primary | ICD-10-CM

## 2024-01-11 PROCEDURE — 20610 DRAIN/INJ JOINT/BURSA W/O US: CPT | Mod: LT | Performed by: FAMILY MEDICINE

## 2024-01-11 RX ORDER — LIDOCAINE HYDROCHLORIDE 10 MG/ML
4 INJECTION, SOLUTION INFILTRATION; PERINEURAL
Status: DISCONTINUED | OUTPATIENT
Start: 2024-01-11 | End: 2024-06-17

## 2024-01-11 RX ORDER — TRIAMCINOLONE ACETONIDE 40 MG/ML
40 INJECTION, SUSPENSION INTRA-ARTICULAR; INTRAMUSCULAR
Status: DISCONTINUED | OUTPATIENT
Start: 2024-01-11 | End: 2024-06-17

## 2024-01-11 RX ADMIN — TRIAMCINOLONE ACETONIDE 40 MG: 40 INJECTION, SUSPENSION INTRA-ARTICULAR; INTRAMUSCULAR at 09:47

## 2024-01-11 RX ADMIN — LIDOCAINE HYDROCHLORIDE 4 ML: 10 INJECTION, SOLUTION INFILTRATION; PERINEURAL at 09:47
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
## 2024-01-11 NOTE — PATIENT INSTRUCTIONS
Thank you for choosing [facility]    [facility] CLIN[facility] LOCATIONS  [place] [address] [address] [facility] [facility], [address]th [place]   [place], [address] [place] [address]   [phone]       APPOINTMENTS: [phone]    CARE QUESTIONS: [phone],    BILLING QUESTIONS: [phone]    FAX NUMBER: [phone]        Follow up: As needed if symptoms do not improve      No diagnosis found.      Steroid Injection Information:    A corticosteroid injection was administered at your visit today.  The area of injection may be sore, slightly swollen for 1-2 days afterward.  Immediately after injection, you may have an area of numbness, which is caused by the local anesthetic, lidocaine (similar to novacaine) in the shot.  This medicine will wear off in about 4 hours.  In addition to the lidocaine, a steroid medication was injected, called triamcinolone acetate.  This medication is intended to provide long-acting antiinflammatory / pain relief.  It may take 2-5 days for this medication to provide noticeable relief.      After an injection, Dr. Horn recommends:    Protecting the area wearing a bandage or gauze pad for at least 24 hours.    Using ice; ice bag or frozen vegetables over the injection site every one to two hours when able (for 15 minutes at a time).      Avoid any strenuous activity even if the knee is already feeling better immediately afterward. Light stretching is encouraged but refrain from home exercise program and increasing activity level for about 48 hours.    Avoid soaking in a hot tub, bath, or pool for 48 hours after injection. Showering is fine!    Watch for signs of infection, including increasing pain, redness and swelling that last more than 48 hours after injection.

## 2024-02-09 ENCOUNTER — TELEPHONE (OUTPATIENT)
Dept: GASTROENTEROLOGY | Facility: CLINIC | Age: 64
End: 2024-02-09
Payer: COMMERCIAL

## 2024-02-09 NOTE — TELEPHONE ENCOUNTER
"Endoscopy Scheduling Screen    Have you had a positive Covid test in the last 14 days?  No    Are you active on MyChart?   No    What insurance is in the chart?  Other:  [facility]    Ordering/Referring Provider:   LIYAH NEWELL        (If ordering provider performs procedure, schedule with ordering provider unless otherwise instructed. )    BMI: Estimated body mass index is 39.03 kg/m  as calculated from the following:    Height as of 1/11/24: 1.778 m (5' 10\").    Weight as of 1/11/24: 123.4 kg (272 lb).     Sedation Ordered  moderate sedation.   If patient BMI > 50 do not schedule in [facility].    If patient BMI > 45 do not schedule at [facility].    Are you taking methadone or Suboxone?  No    Have you had difficulties, pain, or discomfort during past endoscopy procedures?  No    Are you taking any prescription medications for pain 3 or more times per week?   NO - No RN review required.    Do you have a history of malignant hyperthermia or adverse reaction to anesthesia?  No    (Females) Are you currently pregnant?   No     Have you been diagnosed or told you have pulmonary hypertension?   No    Do you have an LVAD?  No    Have you been told you have moderate to severe sleep apnea?  No    Have you been told you have COPD, asthma, or any other lung disease?  Yes     What breathing problems do you have?  Asthma     Do you use home oxygen?  No    Have your breathing problems required an ED visit or hospitalization in the last year?  No    Do you have any heart conditions?  No     Have you ever had an organ transplant?   No    Have you ever had or are you awaiting a heart or lung transplant?   No    Have you had a stroke or transient ischemic attack (TIA aka \"mini stroke\" in the last 6 months?   No    Have you been diagnosed with or been told you have cirrhosis of the liver?   No    Are you currently on dialysis?   No    Do you need assistance transferring?   No    BMI: Estimated body mass index is 39.03 kg/m  as calculated from the " "following:    Height as of 1/11/24: 1.778 m (5' 10\").    Weight as of 1/11/24: 123.4 kg (272 lb).     Is patients BMI > 40 and scheduling location [facility]?  No    Do you take an injectable medication for weight loss or diabetes (excluding insulin)?  No    Do you take the medication Naltrexone?  No    Do you take blood thinners?  No       Prep   Are you currently on dialysis or do you have chronic kidney disease?  No    Do you have a diagnosis of diabetes?  No    Do you have a diagnosis of cystic fibrosis (CF)?  No    On a regular basis do you go 3 -5 days between bowel movements?  No    BMI > 40?  No    Preferred Pharmacy:    [id number] - [place], [place] - [address]   [address] [place] [address]  Phone: [phone] Fax: [phone]    Caller: Willy    Reason for Reschedule/Cancellation (please be detailed, any staff messages or encounters to note?): Schedule Conflict      Prior to reschedule please review:  Ordering Provider: LIYAH NEWELL   Sedation Determined: Moderate  Does patient have any ASC Exclusions, please identify?: No      Notes on Cancelled Procedure:  Procedure: Lower Endoscopy [Colonoscopy]   Date: 02/12/2024  Location: [facility]; [address] [place] [address]  Surgeon: ARUNA      Rescheduled: Yes  Procedure: Lower Endoscopy [Colonoscopy]   Date: 03/06/2024  Location: [facility]; [address] [place] [address]  Surgeon: MARÍA  Sedation Level Scheduled  Moderate,  Reason for Sedation Level Per Order  Prep/Instructions updated and sent: Yes, Letter             "

## 2024-02-21 ENCOUNTER — TELEPHONE (OUTPATIENT)
Dept: GASTROENTEROLOGY | Facility: CLINIC | Age: 64
End: 2024-02-21
Payer: COMMERCIAL

## 2024-02-21 NOTE — LETTER
February 28, 2024      Willy Cooperbobby  [address]  [facility] [address]              Standard MiraLAX Bowel Prep  Prep Instructions for your Colonoscopy  Pre-Assessment Phone Number: [facility]; [phone] option 4    Please read these instructions carefully at least 7 days prior to your colonoscopy procedure. Be sure to follow all directions completely. The inside of your colon must be clean to allow for a complete examination for the presence of any growths, polyps, and/or abnormalities, as well as their biopsy or removal. A number of tips are included in order to make this part of the procedure as comfortable as possible.    Getting ready:   You will receive a call from a Nurse to review instructions and health history.  This assessment must be completed prior to your procedure.  Failure to complete the Nurse assessment phone call may result in the procedure being cancelled.  You must arrange for an adult to drive you home after your exam. Your colonoscopy cannot be done unless you have a ride. If you need to use public transportation, someone must ride with you and stay with you for a minimum of 24 hours.  Check with your insurance company to be sure they will cover this exam.  Go to the drug store and buy:  Four (4) - Dulcolax (Bisacodyl) 5mg tablets   8.3 ounce bottle of Miralax powder  64 ounces of Gatorade (not red or purple)     10 ounce bottle of clear magnesium citrate    7 days before the exam:  Talk to your prescribing provider: If you take blood thinners (such as Coumadin, Plavix, Xarelto, Eliquis, Lovenox, or others), these medications may need to be stopped temporarily before your procedure. Your prescribing provider will tell you what to do.   Talk to your prescribing provider: If you take prescription NSAIDS (such as Sulindac, Celebrex, Mobic, Relafen). Your prescribing provider will tell you what to do.   Stop taking fiber and iron supplements   Stop eating corn, popcorn, nuts and  foods that contain seeds. These can stay in the colon for many days and they can clog up the colonoscope.     3 days before the exam:  Begin a low-fiber diet (see below).   Drink at least 4 to 6 large glasses of water or sports drink (not red or purple) each day.     One day before the exam:  Only clear liquid diet is allowed (see below). No solid food should be eaten.   Drink at least 8 to 10 full glasses of clear liquids during the day.   Stop taking NSAID pain relievers, such as Advil, Ibuprofen, Motrin, etc.  You may take Tylenol.  Note: You will start drinking the colonoscopy prep solution at 5 PM. The timing of second step depends on your appointment arrival time. See Steps 1-2 below.    Step One:  At 4 PM, take 2 Dulcolax (Bisacodyl) tablets.   At 4 PM, mix the entire bottle of Miralax with 64 ounces of Gatorade in a pitcher and stir to dissolve the powder. Chill if desired, but do not add ice.   At 5 PM, start drinking an 8-ounce glass of the Miralax and Gatorade mixture every 15 minutes until the pitcher is HALF empty (about 4 glasses).  Store the rest in the refrigerator.   Bowel movements usually begin about one hour after your finish this first dose of Miralax. The stool is likely to be brown at this stage.   After you start drinking the solution, stay near a toilet. You may have watery stools (diarrhea), mild cramping, bloating , and nausea.   You may want to use Vaseline on the skin around your anus after each bowel movement to prevent irritation. You can also use wet wipes to prevent irritation.  Continue to drink clear liquids.     At 10 PM, take 2 Dulcolax (Bisacodyl) tablets  At 10 PM start drinking an 8-ounce glass of Miralax and Gatorade mixture every 15 minutes until the pitcher is empty (about 4 glasses).       Step Two:   If you arrive for your procedure before 11 AM:    6 hours prior to your scheduled arrival to the endoscopy unit drink 10 ounces of clear Magnesium Citrate    If you arrive  for your procedure after 11 AM:     At 6 AM on the day of the exam: drink 10 ounces of clear Magnesium Citrate        Day of exam:  You may drink clear liquids only up until 2 hours before your arrival time.  You may take your necessary morning medications with sips of water  Do not take diabetes medicine by mouth until after your exam.  Do not smoke, chew tobacco, or swallow anything, including water or gum for at least 2 hours before your arrival time. This is a safety issue. Your procedure could be cancelled if you do not follow directions.  Please do not wear jewelry (i.e. earrings, rings, necklaces, watches, etc) . Leave your purse, billfold, credit cards, and other valuables at home.    Please arrive with a responsible adult who can take you home after the test and stay with you for a minimum of 24 hours: The medicine used will make you sleepy and forgetful. If you do not have someone to take you home, we will cancel your procedure. If using public transportation you must have someone to ride with you.  Please perform your nebulizer treatments and airway clearance therapy in the morning prior to the procedure (if applicable).  If you have asthma, bring your inhalers.       CLEAR LIQUID DIET   You may have:    Water, tea, coffee (no milk or cream)  Soda pop, Gatorade (not red or purple)  Jell-O, Popsicles (no milk or fruit pieces - not red or purple)  Fat-free soup broth or bouillon  Plain hard candy, such as clear life savers (not red or purple)  Clear juices and fruit-flavored drinks, such as apple juice, white grape juice, Hi-C, and Baljeet-Aid (not red or purple)   Do not have:    Milk or milk products such as ice cream, malts or shakes, or coffee creamer  Red or purple drinks of any kind such as cranberry juice or grape juice. Avoid red or purple Jell-O, Popsicles, Baljeet-Aid, sorbet, sherbet and candy  Juices with pulp such as orange, grapefruit, pineapple or tomato juice  Cream soups of any kind  Alcohol and  beer  Protein drinks or protein powder     LOW FIBER DIET   You may have:    Starches: White bread, rolls, biscuits, croissants, Frances toast, white flour tortillas, waffles, pancakes, Slovak toast; white rice, noodles, pasta, macaroni; cooked and peeled potatoes; plain crackers, saltines; cooked farina or cream of rice; puffed rice, corn flakes, Rice Krispies, Special K   Vegetables: tender cooked and canned, vegetable broths  Fruits and fruit juices: Strained fruit juice, canned fruit without seeds or skin (not pineapple), applesauce, pear sauce, ripe bananas, melons (not watermelon)   Milk products: Milk (plain or flavored), cheese, cottage cheese, yogurt (no berries), custard, ice cream    Proteins: Tender, well-cooked ground beef, lamb, veal, ham, pork, chicken, turkey, fish or organ meats; eggs; creamy peanut butter   Fats and condiments:  Margarine, butter, oils, mayonnaise, sour cream, salad dressing, plain gravy; spices, cooked herbs; sugar, clear jelly, honey, syrup   Snacks, sweets and drinks: Pretzels, hard candy; plain cakes and cookies (no nuts or seeds); gelatin, plain pudding, sherbet, Popsicles; coffee, tea, carbonated ( fizzy ) drinks Do not have:    Starches: Breads or rolls that contain nuts, seeds or fruit; whole wheat or whole grain breads that contain more than 1 gram of fiber per slice; cornbread; corn or whole wheat tortillas; potatoes with skin; brown rice, wild rice, kasha (buckwheat), and oatmeal   Vegetables: Any raw or steamed vegetables; vegetables with seeds; corn in any form   Fruits and fruit juices: Prunes, prune juice, raisins and other dried fruits, berries and other fruits with seeds, canned pineapple juices with pulp such as orange, grapefruit, pineapple or tomato juice  Milk products: Any yogurt with nuts, seeds or berries   Proteins: Tough, fibrous meats with gristle; cooked dried beans, peas or lentils; crunchy peanut butter  Fats and condiments: Pickles, olives, relish,  horseradish; jam, marmalade, preserves   Snacks, sweets and drinks: Popcorn, nuts, seeds, granola, coconut, candies made with nuts or seeds; all desserts that contain nuts, seeds, raisins and other dried fruits, coconut, whole grains or bran.      FAQ:    Why should Miralax be mixed with Gatorade or another clear sports drink?   It is important that your body does not develop an imbalance of electrolytes with the large volume of fluid in this prep. Gatorade/sports drinks contains those electrolytes.   Does Miralax have to be mixed with Gatorade?  Gatorade, Powerade, or any of the other sports drinks are acceptable. If you are diabetic, it is acceptable to use the low sugar options, such as Gatorade Zero, Powerade Zero, G2, etc.  Can I put ice in the Gatorade/Miralax mixture?  We prefer that you mix it and put it in the refrigerator to chill instead of using ice. The ice will melt and dilute the laxative.    Why should the Miralax prep be taken in several steps?   The stool is flushed out by a large wave of fluid going through the colon. Just sipping a large volume of the solution will not achieve the desired result. Studies have shown that two smaller waves (or more in some cases) are better than one large one.    Why are the second Miralax dose and Magnesium Citrate so close to the exam?   The intestine continues to produce mucus and waste. Longer intervals between the prep and the exam can lead to less than desired results. However, the stomach must be empty at the time of the exam in order to allow safe sedation. Therefore, there should be nothing by mouth 2 hours before the exam is started.   How do you know if your colon is cleaned out?   After completing the bowel prep, your bowel movements should be all liquid and yellow. Your bowel movements will look similar to urine in the toilet. If there are pieces of stool (poop) in the toilet, or if you can't see to the bottom of the toilet, please call our office for  advice. Call [phone] and ask to speak with a nurse.   Why do you need a responsible  to take you home and stay with you?  We require a responsible adult to take you home for your safety. The sedation medicines used to relax you during the procedure can impair your judgement and reaction time, and make you forgetful and possibly a little unsteady. Do not drive, make any important decisions, or sign any legal documents for 24 hours after your procedure.   It is normal to feel bloated and gassy after your procedure. Walking will help move the air through your colon. You can take non-aspirin pain relievers that contain acetaminophen (Tylenol).     When can you eat after your procedure?  You may resume your normal diet when you feel ready, unless advised otherwise by the doctor performing your procedure. Do not drink alcohol for 24 hours after your procedure.   You many resume normal activities (work, exercise, etc.) after 24 hours.     When will you get test results?  You should have your procedure results and any lab results (if applicable) by letter, MyChart message, or phone call within 2 weeks. If you have any questions, please call the doctor that referred you for the procedure.         Updated: 06/22/2022

## 2024-02-21 NOTE — TELEPHONE ENCOUNTER
Pre visit planning completed.      Procedure details:    Patient scheduled for Colonoscopy  on 03.06.2024.     Arrival time: 1400. Procedure time 1445    Pre op exam needed? N/A    Facility location: [facility]; [address] [place] [address]    Sedation type: Conscious sedation     Indication for procedure:  Screen for colon cancer   Chart review:     Electronic implanted devices? No    Recent diagnosis of diverticulitis within the last 6 weeks? No    Diabetic? No    Diabetic medication HOLDING recommendations: (if applicable)  Oral diabetic medications: N/A  Diabetic injectables: N/A  Insulin: N/A      Medication review:    Anticoagulants? No    NSAIDS? No    Other medication HOLDING recommendations:  Ferrous sulfate (iron supplements): HOLD 7 days before procedure.      Prep for procedure:     Bowel prep recommendation: Standard Miralax   Due to:  standard bowel prep.    Prep instructions sent via letter     Vale Velez RN  Endoscopy Procedure Pre Assessment RN  [phone] option 4

## 2024-02-21 NOTE — LETTER
February 21, 2024      Willy Aquino  [address]  [place] [address]              Dear Willy,    Colonoscopy      Procedure date: 03.06.2024    Anticipated arrival time: 2 PM   (Procedure Times are Subject to Change)    Facility location: [facility]; [address] [facility] [place] [address] Check in: at the  of the main entrance.  Come through the roundabout underneath the awning (not the ER entrance).      Important Procedure Reminders:     Prep Instructions:   Instructions on how to prepare for your upcoming procedure are found below. Please read instructions carefully. Deviation from instructions may result in less than desired outcomes and procedure may need to be rescheduled. If you have additional questions regarding how to prepare for your upcoming procedure, please contact our endoscopy pre assessment nurses at [phone] option 4.      Policy:   On the day of your procedure, please designatean adult(s) who can drive you home and stay with you for 6 hours post procedure. The medicines used in the exam will make you sleepy. You will not be able to drive. You cannot take public transportation, ride share services, or non-medical taxi service without a responsible caregiver.  Medical transport services are allowed with the requirement that a responsible caregiver will receive you at your destination.  We require that drivers and caregivers are confirmed prior to your procedure.    Day of procedure:  Please do not wear jewelry (i.e. earrings, rings, necklaces, watches, etc) .   Leave your purse, billfold, credit cards, and other valuables at home.   Bring insurance card and ID.     To cancel or reschedule your procedure:   Please call our endoscopy scheduling team at: [facility]: [phone], option 2. Monday through Friday, 7:00am-5:00pm.      Medication Reminders:    Please note the following medication holding recommendations:   Ferrous Sulfate  (iron supplement): HOLD 7 days before procedure.    ----------------------------------------------------------------------------------------------------------------------------------------------------------------------------------------------------------------------------------------------------------------------    Standard MiraLAX Bowel Prep  Prep Instructions for your Colonoscopy  Pre-Assessment Phone Number: [facility]; [phone] option 4    Please read these instructions carefully at least 7 days prior to your colonoscopy procedure. Be sure to follow all directions completely. The inside of your colon must be clean to allow for a complete examination for the presence of any growths, polyps, and/or abnormalities, as well as their biopsy or removal. A number of tips are included in order to make this part of the procedure as comfortable as possible.    Getting ready:   You will receive a call from a Nurse to review instructions and health history.  This assessment must be completed prior to your procedure.  Failure to complete the Nurse assessment phone call may result in the procedure being cancelled.  You must arrange for an adult to drive you home after your exam. Your colonoscopy cannot be done unless you have a ride. If you need to use public transportation, someone must ride with you and stay with you for a minimum of 24 hours.  Check with your insurance company to be sure they will cover this exam.  Go to the drug store and buy:  Four (4) - Dulcolax (Bisacodyl) 5mg tablets   8.3 ounce bottle of Miralax powder  64 ounces of Gatorade (not red or purple)     10 ounce bottle of clear magnesium citrate    7 days before the exam:  Talk to your prescribing provider: If you take blood thinners (such as Coumadin, Plavix, Xarelto, Eliquis, Lovenox, or others), these medications may need to be stopped temporarily before your procedure. Your prescribing provider will tell you what to do.   Talk to your  prescribing provider: If you take prescription NSAIDS (such as Sulindac, Celebrex, Mobic, Relafen). Your prescribing provider will tell you what to do.   Stop taking fiber and iron supplements   Stop eating corn, popcorn, nuts and foods that contain seeds. These can stay in the colon for many days and they can clog up the colonoscope.     3 days before the exam:  Begin a low-fiber diet (see below).   Drink at least 4 to 6 large glasses of water or sports drink (not red or purple) each day.     One day before the exam:  Only clear liquid diet is allowed (see below). No solid food should be eaten.   Drink at least 8 to 10 full glasses of clear liquids during the day.   Stop taking NSAID pain relievers, such as Advil, Ibuprofen, Motrin, etc.  You may take Tylenol.  Note: You will start drinking the colonoscopy prep solution at 5 PM. The timing of second step depends on your appointment arrival time. See Steps 1-2 below.    Step One:  At 4 PM, take 2 Dulcolax (Bisacodyl) tablets.   At 4 PM, mix the entire bottle of Miralax with 64 ounces of Gatorade in a pitcher and stir to dissolve the powder. Chill if desired, but do not add ice.   At 5 PM, start drinking an 8-ounce glass of the Miralax and Gatorade mixture every 15 minutes until the pitcher is HALF empty (about 4 glasses).  Store the rest in the refrigerator.   Bowel movements usually begin about one hour after your finish this first dose of Miralax. The stool is likely to be brown at this stage.   After you start drinking the solution, stay near a toilet. You may have watery stools (diarrhea), mild cramping, bloating , and nausea.   You may want to use Vaseline on the skin around your anus after each bowel movement to prevent irritation. You can also use wet wipes to prevent irritation.  Continue to drink clear liquids.     At 10 PM, take 2 Dulcolax (Bisacodyl) tablets  At 10 PM start drinking an 8-ounce glass of Miralax and Gatorade mixture every 15 minutes until  the pitcher is empty (about 4 glasses).       Step Two:   If you arrive for your procedure before 11 AM:    6 hours prior to your scheduled arrival to the endoscopy unit drink 10 ounces of clear Magnesium Citrate    If you arrive for your procedure after 11 AM:     At 6 AM on the day of the exam: drink 10 ounces of clear Magnesium Citrate        Day of exam:  You may drink clear liquids only up until 2 hours before your arrival time.  You may take your necessary morning medications with sips of water  Do not take diabetes medicine by mouth until after your exam.  Do not smoke, chew tobacco, or swallow anything, including water or gum for at least 2 hours before your arrival time. This is a safety issue. Your procedure could be cancelled if you do not follow directions.  Please do not wear jewelry (i.e. earrings, rings, necklaces, watches, etc) . Leave your purse, billfold, credit cards, and other valuables at home.    Please arrive with a responsible adult who can take you home after the test and stay with you for a minimum of 24 hours: The medicine used will make you sleepy and forgetful. If you do not have someone to take you home, we will cancel your procedure. If using public transportation you must have someone to ride with you.  Please perform your nebulizer treatments and airway clearance therapy in the morning prior to the procedure (if applicable).  If you have asthma, bring your inhalers.       CLEAR LIQUID DIET   You may have:    Water, tea, coffee (no milk or cream)  Soda pop, Gatorade (not red or purple)  Jell-O, Popsicles (no milk or fruit pieces - not red or purple)  Fat-free soup broth or bouillon  Plain hard candy, such as clear life savers (not red or purple)  Clear juices and fruit-flavored drinks, such as apple juice, white grape juice, Hi-C, and Baljeet-Aid (not red or purple)   Do not have:    Milk or milk products such as ice cream, malts or shakes, or coffee creamer  Red or purple drinks of any  kind such as cranberry juice or grape juice. Avoid red or purple Jell-O, Popsicles, Baljeet-Aid, sorbet, sherbet and candy  Juices with pulp such as orange, grapefruit, pineapple or tomato juice  Cream soups of any kind  Alcohol and beer  Protein drinks or protein powder     LOW FIBER DIET   You may have:    Starches: White bread, rolls, biscuits, croissants, Frances toast, white flour tortillas, waffles, pancakes, Maltese toast; white rice, noodles, pasta, macaroni; cooked and peeled potatoes; plain crackers, saltines; cooked farina or cream of rice; puffed rice, corn flakes, Rice Krispies, Special K   Vegetables: tender cooked and canned, vegetable broths  Fruits and fruit juices: Strained fruit juice, canned fruit without seeds or skin (not pineapple), applesauce, pear sauce, ripe bananas, melons (not watermelon)   Milk products: Milk (plain or flavored), cheese, cottage cheese, yogurt (no berries), custard, ice cream    Proteins: Tender, well-cooked ground beef, lamb, veal, ham, pork, chicken, turkey, fish or organ meats; eggs; creamy peanut butter   Fats and condiments:  Margarine, butter, oils, mayonnaise, sour cream, salad dressing, plain gravy; spices, cooked herbs; sugar, clear jelly, honey, syrup   Snacks, sweets and drinks: Pretzels, hard candy; plain cakes and cookies (no nuts or seeds); gelatin, plain pudding, sherbet, Popsicles; coffee, tea, carbonated ( fizzy ) drinks Do not have:    Starches: Breads or rolls that contain nuts, seeds or fruit; whole wheat or whole grain breads that contain more than 1 gram of fiber per slice; cornbread; corn or whole wheat tortillas; potatoes with skin; brown rice, wild rice, kasha (buckwheat), and oatmeal   Vegetables: Any raw or steamed vegetables; vegetables with seeds; corn in any form   Fruits and fruit juices: Prunes, prune juice, raisins and other dried fruits, berries and other fruits with seeds, canned pineapple juices with pulp such as orange, grapefruit,  pineapple or tomato juice  Milk products: Any yogurt with nuts, seeds or berries   Proteins: Tough, fibrous meats with gristle; cooked dried beans, peas or lentils; crunchy peanut butter  Fats and condiments: Pickles, olives, relish, horseradish; jam, marmalade, preserves   Snacks, sweets and drinks: Popcorn, nuts, seeds, granola, coconut, candies made with nuts or seeds; all desserts that contain nuts, seeds, raisins and other dried fruits, coconut, whole grains or bran.      FAQ:    Why should Miralax be mixed with Gatorade or another clear sports drink?   It is important that your body does not develop an imbalance of electrolytes with the large volume of fluid in this prep. Gatorade/sports drinks contains those electrolytes.   Does Miralax have to be mixed with Gatorade?  Gatorade, Powerade, or any of the other sports drinks are acceptable. If you are diabetic, it is acceptable to use the low sugar options, such as Gatorade Zero, Powerade Zero, G2, etc.  Can I put ice in the Gatorade/Miralax mixture?  We prefer that you mix it and put it in the refrigerator to chill instead of using ice. The ice will melt and dilute the laxative.    Why should the Miralax prep be taken in several steps?   The stool is flushed out by a large wave of fluid going through the colon. Just sipping a large volume of the solution will not achieve the desired result. Studies have shown that two smaller waves (or more in some cases) are better than one large one.    Why are the second Miralax dose and Magnesium Citrate so close to the exam?   The intestine continues to produce mucus and waste. Longer intervals between the prep and the exam can lead to less than desired results. However, the stomach must be empty at the time of the exam in order to allow safe sedation. Therefore, there should be nothing by mouth 2 hours before the exam is started.   How do you know if your colon is cleaned out?   After completing the bowel prep, your bowel  movements should be all liquid and yellow. Your bowel movements will look similar to urine in the toilet. If there are pieces of stool (poop) in the toilet, or if you can't see to the bottom of the toilet, please call our office for advice. Call [phone] and ask to speak with a nurse.   Why do you need a responsible  to take you home and stay with you?  We require a responsible adult to take you home for your safety. The sedation medicines used to relax you during the procedure can impair your judgement and reaction time, and make you forgetful and possibly a little unsteady. Do not drive, make any important decisions, or sign any legal documents for 24 hours after your procedure.   It is normal to feel bloated and gassy after your procedure. Walking will help move the air through your colon. You can take non-aspirin pain relievers that contain acetaminophen (Tylenol).     When can you eat after your procedure?  You may resume your normal diet when you feel ready, unless advised otherwise by the doctor performing your procedure. Do not drink alcohol for 24 hours after your procedure.   You many resume normal activities (work, exercise, etc.) after 24 hours.     When will you get test results?  You should have your procedure results and any lab results (if applicable) by letter, MyChart message, or phone call within 2 weeks. If you have any questions, please call the doctor that referred you for the procedure.         Updated: 06/22/2022            Sincerely,      [facility]

## 2024-02-21 NOTE — LETTER
June 6, 2024      Willy Aquino  [address]  [place] [address]              Dear Willy,    Colonoscopy      Procedure date: 06.17.2024    Anticipated arrival time: 9:55 AM   (Please note that arrival times may change)    Facility location: [facility]; [address], [place] [address] Check in location: Main entrance at . Come through the roundabout underneath the awning (not the ER entrance).      Important Procedure Reminders:     Prep Instructions:   Instructions on how to prepare for your upcoming procedure are found below. Please read instructions carefully. Deviation from instructions may result in less than desired outcomes and procedure may need to be rescheduled.   If you have additional questions regarding how to prepare for your upcoming procedure, contact our endoscopy pre assessment nurses at [phone] option 4 Monday through Friday 7:00am-5:00pm     Policy:   The medications used during the procedure will make you sleepy, so you won't be able to drive. On the day of your procedure, please have an adult ready to drive you home and stay with you for the next 6 hours.   You can't use public transportation, ride-share services, or non-medical taxi services without a responsible caregiver. Medical transport services are okay, but a caregiver must be there to receive you at your destination.   Make sure your  and caregiver are confirmed before your procedure.      Day of procedure:  Please keep in mind that arrival times may change. Let your  know there might be a one-hour window for changes.  We ask that you please check in at the  with your . Your  should remain on campus.  Expect to be at the procedure center for about 1.5-2.5 hours.    Please do not wear jewelry (i.e. earrings, rings, necklaces, watches, etc). Leave your purse, billfold, credit cards, and other valuables at home.   Bring insurance card and ID.     To cancel  or reschedule your procedure:   Within 14 days of your procedure if you develop any flu-like symptoms (such as fever, cough, shortness of breath), COVID-19 like symptoms or exposure to COVID-19, contact our endoscopy team at [phone] option 4 to determine if procedure can be completed or needs to be delayed.   If you need to cancel or reschedule, our endoscopy scheduling team can be reached at [phone], option 2. Monday through Friday, 7:00am-5:00pm.      Medication Reminders:    Please note the following medication holding recommendations:   N/A    ----------------------------------------------------------------------------------------------------------------------------------------------------------------------------------------------------------------------------------------------------------------------    Standard MiraLAX Bowel Prep  Prep Instructions for your Colonoscopy  Pre-Assessment Phone Number: [facility]; [phone] option 4    Please read these instructions carefully at least 7 days prior to your colonoscopy procedure. Be sure to follow all directions completely. The inside of your colon must be clean to allow for a complete examination for the presence of any growths, polyps, and/or abnormalities, as well as their biopsy or removal. A number of tips are included in order to make this part of the procedure as comfortable as possible.    Getting ready:   A nurse will call you to go over instructions and your health history.  It's important to complete the nurse assessment before your procedure. If you don't, your procedure might have to be canceled.  You must arrange for an adult to drive you home after your exam. Your colonoscopy cannot be done unless you have a ride. If you need to use public transportation, someone must ride with you and stay with you for a minimum of 24 hours.  Check with your insurance company to be sure they will cover this exam.  Go to the drug store and buy:  Four  (4) - Dulcolax (Bisacodyl) 5mg tablets   8.3 ounce bottle of Miralax powder  64 ounces of Gatorade (not red or purple)     10 ounce bottle of clear magnesium citrate    7 days before the exam:  Talk to your prescribing provider: If you take blood thinners (such as Coumadin, Plavix, Xarelto, Eliquis, Lovenox, or others), these medications may need to be stopped temporarily before your procedure. Your prescribing provider will tell you what to do.   Talk to your prescribing provider: If you take prescription NSAIDS (such as Sulindac, Celebrex, Mobic, Relafen). Your prescribing provider will tell you what to do.   Stop taking fiber and iron supplements   Stop eating corn, popcorn, nuts and foods that contain seeds. These can stay in the colon for many days and they can clog up the colonoscope.     3 days before the exam:  Begin a low-fiber diet (see below).   Drink at least 4 to 6 large glasses of water or sports drink (not red or purple) each day.     One day before the exam:  Only clear liquid diet is allowed (see below). No solid food should be eaten.   Drink at least 8 to 10 full glasses of clear liquids during the day.   Stop taking NSAID pain relievers, such as Advil, Ibuprofen, Motrin, etc.  You may take Tylenol.  Note: You will start drinking the colonoscopy prep solution at 5 PM. The timing of second step depends on your appointment arrival time. See Steps 1-2 below.    Step One:  At 4 PM, take 2 Dulcolax (Bisacodyl) tablets.   At 4 PM, mix the entire bottle of Miralax with 64 ounces of Gatorade in a pitcher and stir to dissolve the powder. Chill if desired, but do not add ice.   At 5 PM, start drinking an 8-ounce glass of the Miralax and Gatorade mixture every 15 minutes until the pitcher is HALF empty (about 4 glasses).  Store the rest in the refrigerator.   Bowel movements usually begin about one hour after your finish this first dose of Miralax. The stool is likely to be brown at this stage.   After you  start drinking the solution, stay near a toilet. You may have watery stools (diarrhea), mild cramping, bloating , and nausea.   You may want to use Vaseline on the skin around your anus after each bowel movement to prevent irritation. You can also use wet wipes to prevent irritation.  Continue to drink clear liquids.     At 10 PM, take 2 Dulcolax (Bisacodyl) tablets  At 10 PM start drinking an 8-ounce glass of Miralax and Gatorade mixture every 15 minutes until the pitcher is empty (about 4 glasses).       Step Two:   If you arrive for your procedure before 11 AM:    6 hours prior to your scheduled arrival to the endoscopy unit drink 10 ounces of clear Magnesium Citrate    If you arrive for your procedure after 11 AM:     At 6 AM on the day of the exam: drink 10 ounces of clear Magnesium Citrate        Day of exam:  You may drink clear liquids only up until 2 hours before your arrival time.  You may take your necessary morning medications with sips of water  Do not take diabetes medicine by mouth until after your exam.  Do not smoke or swallow anything, including water or gum for at least 2 hours before your arrival time. This is a safety issue. Your procedure could be cancelled if you do not follow directions.  No chewing tobacco 6 hours prior to procedure arrival time.   Please do not wear jewelry (i.e. earrings, rings, necklaces, watches, etc) . Leave your purse, billfold, credit cards, and other valuables at home.    Please arrive with a responsible adult who can take you home after the test and stay with you for a minimum of 24 hours: The medicine used will make you sleepy and forgetful. If you do not have someone to take you home, we will cancel your procedure. If using public transportation you must have someone to ride with you.  Please perform your nebulizer treatments and airway clearance therapy in the morning prior to the procedure (if applicable).  If you have asthma, bring your inhalers.       CLEAR  LIQUID DIET   You may have:    Water, tea, coffee (no milk or cream)  Soda pop, Gatorade (not red or purple)  Jell-O, Popsicles (no milk or fruit pieces - not red or purple)  Fat-free soup broth or bouillon  Plain hard candy, such as clear life savers (not red or purple)  Clear juices and fruit-flavored drinks, such as apple juice, white grape juice, Hi-C, and Baljeet-Aid (not red or purple)   Do not have:    Milk or milk products such as ice cream, malts or shakes, or coffee creamer  Red or purple drinks of any kind such as cranberry juice or grape juice. Avoid red or purple Jell-O, Popsicles, Baljeet-Aid, sorbet, sherbet and candy  Juices with pulp such as orange, grapefruit, pineapple or tomato juice  Cream soups of any kind  Alcohol and beer  Protein drinks or protein powder     LOW FIBER DIET   You may have:    Starches: White bread, rolls, biscuits, croissants, [place] toast, white flour tortillas, waffles, pancakes, Estonian toast; white rice, noodles, pasta, macaroni; cooked and peeled potatoes; plain crackers, saltines; cooked farina or cream of rice; puffed rice, corn flakes, Rice Krispies, Special K   Vegetables: tender cooked and canned, vegetable broths  Fruits and fruit juices: Strained fruit juice, canned fruit without seeds or skin (not pineapple), applesauce, pear sauce, ripe bananas, melons (not watermelon)   Milk products: Milk (plain or flavored), cheese, cottage cheese, yogurt (no berries), custard, ice cream    Proteins: Tender, well-cooked ground beef, lamb, veal, ham, pork, chicken, turkey, fish or organ meats; eggs; creamy peanut butter   Fats and condiments:  Margarine, butter, oils, mayonnaise, sour cream, salad dressing, plain gravy; spices, cooked herbs; sugar, clear jelly, honey, syrup   Snacks, sweets and drinks: Pretzels, hard candy; plain cakes and cookies (no nuts or seeds); gelatin, plain pudding, sherbet, Popsicles; coffee, tea, carbonated ( fizzy ) drinks Do not have:    Starches: Breads  or rolls that contain nuts, seeds or fruit; whole wheat or whole grain breads that contain more than 1 gram of fiber per slice; cornbread; corn or whole wheat tortillas; potatoes with skin; brown rice, wild rice, kasha (buckwheat), and oatmeal   Vegetables: Any raw or steamed vegetables; vegetables with seeds; corn in any form   Fruits and fruit juices: Prunes, prune juice, raisins and other dried fruits, berries and other fruits with seeds, canned pineapple juices with pulp such as orange, grapefruit, pineapple or tomato juice  Milk products: Any yogurt with nuts, seeds or berries   Proteins: Tough, fibrous meats with gristle; cooked dried beans, peas or lentils; crunchy peanut butter  Fats and condiments: Pickles, olives, relish, horseradish; jam, marmalade, preserves   Snacks, sweets and drinks: Popcorn, nuts, seeds, granola, coconut, candies made with nuts or seeds; all desserts that contain nuts, seeds, raisins and other dried fruits, coconut, whole grains or bran.      FAQ:    Why should Miralax be mixed with Gatorade or another clear sports drink?   It is important that your body does not develop an imbalance of electrolytes with the large volume of fluid in this prep. Gatorade/sports drinks contains those electrolytes.   Does Miralax have to be mixed with Gatorade?  Gatorade, Powerade, or any of the other sports drinks are acceptable. If you are diabetic, it is acceptable to use the low sugar options, such as Gatorade Zero, Powerade Zero, G2, etc.  Can I put ice in the Gatorade/Miralax mixture?  We prefer that you mix it and put it in the refrigerator to chill instead of using ice. The ice will melt and dilute the laxative.    Why should the Miralax prep be taken in several steps?   The stool is flushed out by a large wave of fluid going through the colon. Just sipping a large volume of the solution will not achieve the desired result. Studies have shown that two smaller waves (or more in some cases) are  better than one large one.    Why are the second Miralax dose and Magnesium Citrate so close to the exam?   The intestine continues to produce mucus and waste. Longer intervals between the prep and the exam can lead to less than desired results. However, the stomach must be empty at the time of the exam in order to allow safe sedation. Therefore, there should be nothing by mouth 2 hours before the exam is started.   How do you know if your colon is cleaned out?   After completing the bowel prep, your bowel movements should be all liquid and yellow. Your bowel movements will look similar to urine in the toilet. If there are pieces of stool (poop) in the toilet, or if you can't see to the bottom of the toilet, please call our office for advice. Call [phone] and ask to speak with a nurse.   Why do you need a responsible  to take you home and stay with you?  We require a responsible adult to take you home for your safety. The sedation medicines used to relax you during the procedure can impair your judgement and reaction time, and make you forgetful and possibly a little unsteady. Do not drive, make any important decisions, or sign any legal documents for 24 hours after your procedure.   It is normal to feel bloated and gassy after your procedure. Walking will help move the air through your colon. You can take non-aspirin pain relievers that contain acetaminophen (Tylenol).     When can you eat after your procedure?  You may resume your normal diet when you feel ready, unless advised otherwise by the doctor performing your procedure. Do not drink alcohol for 24 hours after your procedure.   You many resume normal activities (work, exercise, etc.) after 24 hours.     When will you get test results?  You should have your procedure results and any lab results (if applicable) by letter, MyChart message, or phone call within 2 weeks. If you have any questions, please call the doctor that referred you for the  procedure.         Updated: 06/22/2022            Sincerely,  [facility]

## 2024-02-23 NOTE — TELEPHONE ENCOUNTER
Pre assessment completed for upcoming procedure.   (Please see previous telephone encounter notes for complete details)        Procedure details:    Arrival time and facility location reviewed.    Pre op exam needed? N/A    Designated  policy reviewed. Instructed to have someone stay 6  hours post procedure.       Medication review:    Medications reviewed. Please see supporting documentation below. Holding recommendations discussed (if applicable).   N/A  Patient reported not taking iron.       Prep for procedure:     Procedure prep instructions reviewed.        Any additional information needed:  N/A      Patient  verbalized understanding and had no questions or concerns at this time.      Vale Velez RN  Endoscopy Procedure Pre Assessment RN  [phone] option 4

## 2024-03-04 ENCOUNTER — TELEPHONE (OUTPATIENT)
Dept: GASTROENTEROLOGY | Facility: CLINIC | Age: 64
End: 2024-03-04
Payer: COMMERCIAL

## 2024-03-04 NOTE — TELEPHONE ENCOUNTER
Caller: Willy Aquino      Reason for Reschedule/Cancellation   (please be detailed, any staff messages or encounters to note?): PT  WAS NOT AVAILABLE      Prior to reschedule please review:  Ordering Provider: REECE  Sedation Determined: MODERATE  Does patient have any ASC Exclusions, please identify?: N      Notes on Cancelled Procedure:  Procedure: Lower Endoscopy [Colonoscopy]   Date: 03/06/2024  Location: [facility]; [address] [place] [address]  Surgeon: MARÍA      Rescheduled: Yes,   Procedure: Lower Endoscopy [Colonoscopy]    Date: 06/17/2024   Location: [facility]; [address] [place] [address]   Surgeon: ARUNA   Sedation Level Scheduled  MODERATE,  Reason for Sedation Level PER ORDER   Instructions updated and sent: VIA LETTER    Does patient need PAC or Pre -Op Rescheduled? : NO       Did you cancel or rescheduled an EUS procedure? No.

## 2024-05-06 DIAGNOSIS — J45.909 ASTHMA, UNSPECIFIED ASTHMA SEVERITY, UNSPECIFIED WHETHER COMPLICATED, UNSPECIFIED WHETHER PERSISTENT: ICD-10-CM

## 2024-05-09 RX ORDER — ALBUTEROL SULFATE 90 UG/1
AEROSOL, METERED RESPIRATORY (INHALATION)
Qty: 8.5 G | Refills: 0 | Status: SHIPPED | OUTPATIENT
Start: 2024-05-09 | End: 2024-09-04

## 2024-05-09 RX ORDER — FLUTICASONE PROPIONATE AND SALMETEROL 500; 50 UG/1; UG/1
1 POWDER RESPIRATORY (INHALATION) EVERY 12 HOURS
Qty: 60 EACH | Refills: 0 | Status: SHIPPED | OUTPATIENT
Start: 2024-05-09 | End: 2024-09-04

## 2024-05-13 NOTE — PROGRESS NOTES
ASSESSMENT & PLAN  Patient Instructions     1. Primary osteoarthritis of left knee      -Patient is following up for chronic left knee pain and swelling due to arthritis  -Reviewed previous treatment notes with Dr. Horn.  I discussed previous treatments with the patient as well.  -I also reviewed the previous MRI of the left knee performed on 10/19/2023.  Results were discussed with the patient and explained.  -Patient tolerated cortisone injection today without complications.  Patient was given postprocedure instructions  -Patient reports that her last cortisone injection only provided approximately 3 weeks of relief.  However patient is traveling soon and would like some relief while she is gone  -Prior authorization for Durolane injection was ordered today.  -Patient will follow-up in 2 to 3 weeks when she returns from her trip to administer the medication  -Patient will continue with home exercise program  -We also discussed potential genicular nerve block and ablation if patient does not get adequate relief from the Durolane medication  -Patient is not interested in surgery at this time  -Call direct clinic number [[phone]] at any time with questions or concerns.    Albert Yeo MD [facility]  [facility]        -----    SUBJECTIVE:  Willy Aquino is a 63 year old female who is seen in follow-up for left knee pain. They were last seen 1/11/2024 by Dr. Horn where they received a cortisone injection.     Since their last visit reports worsening pain. They indicate that their current pain level is 8/10. They have tried physical therapy (7 visits), and a corticosteroid injection (last injection: 1/11/24) that provided 1.5 weeks of relief.      The patient is seen by themselves.    Patient's past medical, surgical, social, and family histories were reviewed today and no changes are noted.    REVIEW OF SYSTEMS:  Constitutional: NEGATIVE for fever, chills,  "change in weight  Skin: NEGATIVE for worrisome rashes, moles or lesions  GI/: NEGATIVE for bowel or bladder changes  Neuro: NEGATIVE for weakness, dizziness or paresthesias    OBJECTIVE:  /79   Ht 1.778 m (5' 10\")   Wt 122.5 kg (270 lb)   BMI 38.74 kg/m     General: healthy, alert and in no distress  HEENT: no scleral icterus or conjunctival erythema  Skin: no suspicious lesions or rash. No jaundice.  CV: regular rhythm by palpation, no pedal edema  Resp: normal respiratory effort without conversational dyspnea   Psych: normal mood and affect  Gait: normal steady gait with appropriate coordination and balance  Neuro: normal light touch sensory exam of the extremities.    MSK:  LEFT KNEE  Inspection:    Normal alignment; no edema, erythema, or ecchymosis present  Palpation:    Tender about the medial patellar facet, and medial joint line. Remainder of bony and ligamentous landmarks are nontender.    Moderate effusion is present    Patellofemoral crepitus is Present  Range of Motion:     00 extension to 1200 flexion  Strength:    Quadriceps grossly intact    Extensor mechanism intact  Special Tests:    Positive: Patellar grind    Negative: MCL/valgus stress (0 & 30 deg), LCL/varus stress (0 & 30 deg), Lachman's, anterior drawer, posterior drawer, Tfif's    Independent visualization of the below image:    MR left knee without contrast 10/19/2023 12:35 PM     Techniques: Multiplanar multisequence imaging of the left knee was  obtained without administration of intra-articular or intravenous  contrast using routing protocol.     History: Acute pain of left knee     Comparison: 10/18/2023     Findings:     Motion degrading images on the axial fat suppressed fluid sensitive  sequence.     MENISCI:  Medial meniscus: Partial tear posterior root ligament of the medial  meniscus with associated subcortical edema like marrow signal  intensity, horizontal tear extension into the posterior horn and body,  and mild " peripheral extrusion of meniscal body.  Lateral meniscus: Horizontal tear anterior horn, body and posterior  horn of lateral meniscus extending into the root ligaments with  associated subcortical cystlike change/edema like marrow signal  intensity at the lateral tibial spine.     LIGAMENTS  Cruciate ligaments: Intact.  Medial supporting structures: Mild thickening of tibial collateral  ligament. Focal defect tibial collateral ligament/posterior oblique  remaining junction just above the level of the knee joint. Otherwise  intact.  Lateral supporting structures: Intact.     EXTENSOR MECHANISM  Intact.     FLUID  Small joint effusion. Small Baker's cyst, containing internal debris.     OSSEOUS and ARTICULAR STRUCTURES  Bones: No fracture, contusion, or osseous lesion is seen. Edema like  marrow signal intensity peripheral aspect of medial tibial plateau,  likely reactive to underlying meniscal pathology. Osteophytosis.     Patellofemoral compartment: Areas of full-thickness chondral loss and  fissuring with subchondral cystlike change/edema like marrow signal  intensity at the patellar median ridge. Hypointense signal central  trochlea likely representing full-thickness chondral fissure present.     Medial compartment: Full-thickness chondral loss posterior  weightbearing surface of the medial femoral condyle with subchondral  edema, significant density.     Lateral compartment: No hyaline cartilage disease.     ANCILLARY FINDINGS  Mild subcutaneous edema especially anteriorly and medially.                                                                      Impression:  1. Partial tear posterior root ligament of the medial meniscus with  horizontal tear extension into the posterior horn and body.  2. Horizontal tear anterior horn, body and posterior horn of lateral  meniscus extending into the root ligaments.  3. 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     AIRAM KAUR      Large Joint Injection/Arthocentesis: L knee joint    Date/Time: 5/15/2024 8:21 AM    Performed by: Yeo, Albert, MD  Authorized by: Yeo, Albert, MD    Indications:  Pain and osteoarthritis  Needle Size:  22 G  Guidance: ultrasound    Approach:  Anterolateral  Location:  Knee      Medications:  40 mg methylPREDNISolone 40 MG/ML; 4 mL ROPivacaine 5 MG/ML  Outcome:  Tolerated well, no immediate complications  Procedure discussed: discussed risks, benefits, and alternatives    Consent Given by:  Patient  Timeout: timeout called immediately prior to procedure    Prep: patient was prepped and draped in usual sterile fashion     Ultrasound was used to ensure safe and accurate needle placement and injection. Ultrasound images of the procedure were permanently stored.      Patient's conditions were thoroughly discussed during today's visit with total time spent face-to-face with the patient and documentation being 35 minutes.     Albert Yeo MD, [facility]

## 2024-05-15 ENCOUNTER — OFFICE VISIT (OUTPATIENT)
Dept: ORTHOPEDICS | Facility: CLINIC | Age: 64
End: 2024-05-15
Payer: COMMERCIAL

## 2024-05-15 VITALS
DIASTOLIC BLOOD PRESSURE: 79 MMHG | SYSTOLIC BLOOD PRESSURE: 132 MMHG | BODY MASS INDEX: 38.65 KG/M2 | WEIGHT: 270 LBS | HEIGHT: 70 IN

## 2024-05-15 DIAGNOSIS — M17.12 PRIMARY OSTEOARTHRITIS OF LEFT KNEE: Primary | ICD-10-CM

## 2024-05-15 PROCEDURE — 20611 DRAIN/INJ JOINT/BURSA W/US: CPT | Mod: LT | Performed by: FAMILY MEDICINE

## 2024-05-15 RX ORDER — METHYLPREDNISOLONE ACETATE 40 MG/ML
40 INJECTION, SUSPENSION INTRA-ARTICULAR; INTRALESIONAL; INTRAMUSCULAR; SOFT TISSUE
Status: DISCONTINUED | OUTPATIENT
Start: 2024-05-15 | End: 2024-06-17

## 2024-05-15 RX ORDER — ROPIVACAINE HYDROCHLORIDE 5 MG/ML
4 INJECTION, SOLUTION EPIDURAL; INFILTRATION; PERINEURAL
Status: DISCONTINUED | OUTPATIENT
Start: 2024-05-15 | End: 2024-06-17

## 2024-05-15 RX ADMIN — ROPIVACAINE HYDROCHLORIDE 4 ML: 5 INJECTION, SOLUTION EPIDURAL; INFILTRATION; PERINEURAL at 08:21

## 2024-05-15 RX ADMIN — METHYLPREDNISOLONE ACETATE 40 MG: 40 INJECTION, SUSPENSION INTRA-ARTICULAR; INTRALESIONAL; INTRAMUSCULAR; SOFT TISSUE at 08:21

## 2024-05-15 NOTE — LETTER
5/15/2024         RE: Willy Aquino  [address]  [place] [address]        Dear Colleague,    Thank you for referring your patient, Willy Aquino, to the [facility] [place]. Please see a copy of my visit note below.    ASSESSMENT & PLAN  Patient Instructions     1. Primary osteoarthritis of left knee      -Patient is following up for chronic left knee pain and swelling due to arthritis  -Reviewed previous treatment notes with Dr. Horn.  I discussed previous treatments with the patient as well.  -I also reviewed the previous MRI of the left knee performed on 10/19/2023.  Results were discussed with the patient and explained.  -Patient tolerated cortisone injection today without complications.  Patient was given postprocedure instructions  -Patient reports that her last cortisone injection only provided approximately 3 weeks of relief.  However patient is traveling soon and would like some relief while she is gone  -Prior authorization for Durolane injection was ordered today.  -Patient will follow-up in 2 to 3 weeks when she returns from her trip to administer the medication  -Patient will continue with home exercise program  -We also discussed potential genicular nerve block and ablation if patient does not get adequate relief from the Durolane medication  -Patient is not interested in surgery at this time  -Call direct clinic number [[phone]] at any time with questions or concerns.    Albert Yeo MD [facility] and Sports Medicine  [facility]        -----    SUBJECTIVE:  Willy Aquino is a 63 year old female who is seen in follow-up for left knee pain. They were last seen 1/11/2024 by Dr. Horn where they received a cortisone injection.     Since their last visit reports worsening pain. They indicate that their current pain level is 8/10. They have tried physical therapy (7 visits), and a corticosteroid injection (last  "injection: 1/11/24) that provided 1.5 weeks of relief.      The patient is seen by themselves.    Patient's past medical, surgical, social, and family histories were reviewed today and no changes are noted.    REVIEW OF SYSTEMS:  Constitutional: NEGATIVE for fever, chills, change in weight  Skin: NEGATIVE for worrisome rashes, moles or lesions  GI/: NEGATIVE for bowel or bladder changes  Neuro: NEGATIVE for weakness, dizziness or paresthesias    OBJECTIVE:  /79   Ht 1.778 m (5' 10\")   Wt 122.5 kg (270 lb)   BMI 38.74 kg/m     General: healthy, alert and in no distress  HEENT: no scleral icterus or conjunctival erythema  Skin: no suspicious lesions or rash. No jaundice.  CV: regular rhythm by palpation, no pedal edema  Resp: normal respiratory effort without conversational dyspnea   Psych: normal mood and affect  Gait: normal steady gait with appropriate coordination and balance  Neuro: normal light touch sensory exam of the extremities.    MSK:  LEFT KNEE  Inspection:    Normal alignment; no edema, erythema, or ecchymosis present  Palpation:    Tender about the medial patellar facet, and medial joint line. Remainder of bony and ligamentous landmarks are nontender.    Moderate effusion is present    Patellofemoral crepitus is Present  Range of Motion:     00 extension to 1200 flexion  Strength:    Quadriceps grossly intact    Extensor mechanism intact  Special Tests:    Positive: Patellar grind    Negative: MCL/valgus stress (0 & 30 deg), LCL/varus stress (0 & 30 deg), Lachman's, anterior drawer, posterior drawer, Tiff's    Independent visualization of the below image:    MR left knee without contrast 10/19/2023 12:35 PM     Techniques: Multiplanar multisequence imaging of the left knee was  obtained without administration of intra-articular or intravenous  contrast using routing protocol.     History: Acute pain of left knee     Comparison: 10/18/2023     Findings:     Motion degrading images on the " axial fat suppressed fluid sensitive  sequence.     MENISCI:  Medial meniscus: Partial tear posterior root ligament of the medial  meniscus with associated subcortical edema like marrow signal  intensity, horizontal tear extension into the posterior horn and body,  and mild peripheral extrusion of meniscal body.  Lateral meniscus: Horizontal tear anterior horn, body and posterior  horn of lateral meniscus extending into the root ligaments with  associated subcortical cystlike change/edema like marrow signal  intensity at the lateral tibial spine.     LIGAMENTS  Cruciate ligaments: Intact.  Medial supporting structures: Mild thickening of tibial collateral  ligament. Focal defect tibial collateral ligament/posterior oblique  remaining junction just above the level of the knee joint. Otherwise  intact.  Lateral supporting structures: Intact.     EXTENSOR MECHANISM  Intact.     FLUID  Small joint effusion. Small Baker's cyst, containing internal debris.     OSSEOUS and ARTICULAR STRUCTURES  Bones: No fracture, contusion, or osseous lesion is seen. Edema like  marrow signal intensity peripheral aspect of medial tibial plateau,  likely reactive to underlying meniscal pathology. Osteophytosis.     Patellofemoral compartment: Areas of full-thickness chondral loss and  fissuring with subchondral cystlike change/edema like marrow signal  intensity at the patellar median ridge. Hypointense signal central  trochlea likely representing full-thickness chondral fissure present.     Medial compartment: Full-thickness chondral loss posterior  weightbearing surface of the medial femoral condyle with subchondral  edema, significant density.     Lateral compartment: No hyaline cartilage disease.     ANCILLARY FINDINGS  Mild subcutaneous edema especially anteriorly and medially.                                                                      Impression:  1. Partial tear posterior root ligament of the medial meniscus  with  horizontal tear extension into the posterior horn and body.  2. Horizontal tear anterior horn, body and posterior horn of lateral  meniscus extending into the root ligaments.  3. 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     AIRAM KAUR     Large Joint Injection/Arthocentesis: L knee joint    Date/Time: 5/15/2024 8:21 AM    Performed by: Yeo, Albert, MD  Authorized by: Yeo, Albert, MD    Indications:  Pain and osteoarthritis  Needle Size:  22 G  Guidance: ultrasound    Approach:  Anterolateral  Location:  Knee      Medications:  40 mg methylPREDNISolone 40 MG/ML; 4 mL ROPivacaine 5 MG/ML  Outcome:  Tolerated well, no immediate complications  Procedure discussed: discussed risks, benefits, and alternatives    Consent Given by:  Patient  Timeout: timeout called immediately prior to procedure    Prep: patient was prepped and draped in usual sterile fashion     Ultrasound was used to ensure safe and accurate needle placement and injection. Ultrasound images of the procedure were permanently stored.      Patient's conditions were thoroughly discussed during today's visit with total time spent face-to-face with the patient and documentation being 35 minutes.     Albert Yeo MD, [facility]      Again, thank you for allowing me to participate in the care of your patient.        Sincerely,        Albert Yeo, MD

## 2024-05-15 NOTE — PATIENT INSTRUCTIONS
1. Primary osteoarthritis of left knee      -Patient is following up for chronic left knee pain and swelling due to arthritis  -Reviewed previous treatment notes with Dr. Horn.  I discussed previous treatments with the patient as well.  -I also reviewed the previous MRI of the left knee performed on 10/19/2023.  Results were discussed with the patient and explained.  -Patient tolerated cortisone injection today without complications.  Patient was given postprocedure instructions  -Patient reports that her last cortisone injection only provided approximately 3 weeks of relief.  However patient is traveling soon and would like some relief while she is gone  -Prior authorization for Durolane injection was ordered today.  -Patient will follow-up in 2 to 3 weeks when she returns from her trip to administer the medication  -Patient will continue with home exercise program  -We also discussed potential genicular nerve block and ablation if patient does not get adequate relief from the Durolane medication  -Patient is not interested in surgery at this time  -Call direct clinic number [[phone]] at any time with questions or concerns.    Albert Yeo MD [facility] Orthopedics and Sports Medicine  [facility] [place]

## 2024-06-06 NOTE — TELEPHONE ENCOUNTER
Conjuntivae and eyelids appear normal, Sclerae : White without injection Rescheduled Colonoscopy     Pre visit planning completed.      Procedure details:    Patient scheduled for Colonoscopy  on 06.17.2024.     Arrival time: 0955. Procedure time 1040    Facility location: [facility]; [place] [address]. Check in location: Main entrance at . Come through the roundabout underneath the awning (not the ER entrance).    Sedation type: Conscious sedation     Pre op exam needed? N/A    Indication for procedure: Screen for colon cancer       Chart review:     Electronic implanted devices? No    Recent diagnosis of diverticulitis within the last 6 weeks? No    Diabetic? No      Medication review:    Anticoagulants? No    NSAIDS? No NSAID medications per patient's medication list.  RN will verify with pre-assessment call.    Other medication HOLDING recommendations:  Ferrous sulfate (iron supplements): HOLD 7 days before procedure.      Prep for procedure:     Bowel prep recommendation: Standard Miralax  Due to: standard bowel prep.    Prep instructions sent via letter         Vale Velez RN  Endoscopy Procedure Pre Assessment RN  [phone] option 4

## 2024-06-06 NOTE — TELEPHONE ENCOUNTER
Pre assessment completed for upcoming procedure.   (Please see previous telephone encounter notes for complete details)      Procedure details:    Arrival time and facility location reviewed.    Pre op exam needed? N/A    Designated  policy reviewed. Instructed to have someone stay 6  hours post procedure.       Medication review:    Medications reviewed. Please see supporting documentation below. Holding recommendations discussed (if applicable).   NSAID medication(s): Ibuprofen (Advil, Motrin): HOLD 1 day before procedure.      Prep for procedure:     Procedure prep instructions NOT reviewed.  Patient did not want to review. Had no questions. Discussed how updated prep instructions were sent today but they would be the same as the last ones it will just include the new date and time.       Any additional information needed:  N/A      Patient  verbalized understanding and had no questions or concerns at this time.      Gabriela Phillip RN  Endoscopy Procedure Pre Assessment   [phone] option 4

## 2024-06-06 NOTE — PROGRESS NOTES
ASSESSMENT & PLAN  Patient Instructions     1. Primary osteoarthritis of left knee      -Patient is following up for chronic left knee pain and swelling due to arthritis  -Patient tolerated aspiration and Durolane injection today without complications.  Patient was given postprocedure instructions  -Patient will follow-up when pain and swelling return  -Call direct clinic number [[phone]] at any time with questions or concerns.    Albert Yeo MD [facility] Orthopedics and Sports Medicine  [facility]        -----    SUBJECTIVE:  Willy Aquino is a 63 year old female who is seen for left knee Durolane injection.    Large Joint Injection/Arthocentesis: L knee joint    Date/Time: 6/12/2024 8:05 AM    Performed by: Yeo, Albert, MD  Authorized by: Yeo, Albert, MD    Indications:  Pain and osteoarthritis  Needle Size:  22 G  Guidance: ultrasound    Approach:  Anterolateral  Location:  Knee      Medications:  60 mg sodium hyaluronate 60 MG/3ML; 5 mL lidocaine 1 %  Aspirate amount (mL):  40  Outcome:  Tolerated well, no immediate complications  Procedure discussed: discussed risks, benefits, and alternatives    Consent Given by:  Patient  Timeout: timeout called immediately prior to procedure    Prep: patient was prepped and draped in usual sterile fashion     Ultrasound was used to ensure safe and accurate needle placement and injection. Ultrasound images of the procedure were permanently stored.        Patient rates pain as 6/10 pre-procedure. Patient rates pain as 3/10 post-procedure.      Albert Yeo MD, [facility]

## 2024-06-12 ENCOUNTER — OFFICE VISIT (OUTPATIENT)
Dept: ORTHOPEDICS | Facility: CLINIC | Age: 64
End: 2024-06-12
Payer: COMMERCIAL

## 2024-06-12 DIAGNOSIS — M17.12 PRIMARY OSTEOARTHRITIS OF LEFT KNEE: Primary | ICD-10-CM

## 2024-06-12 PROCEDURE — 20611 DRAIN/INJ JOINT/BURSA W/US: CPT | Mod: LT | Performed by: FAMILY MEDICINE

## 2024-06-12 RX ORDER — LIDOCAINE HYDROCHLORIDE 10 MG/ML
5 INJECTION, SOLUTION INFILTRATION; PERINEURAL
Status: DISCONTINUED | OUTPATIENT
Start: 2024-06-12 | End: 2024-06-17

## 2024-06-12 RX ADMIN — LIDOCAINE HYDROCHLORIDE 5 ML: 10 INJECTION, SOLUTION INFILTRATION; PERINEURAL at 08:05

## 2024-06-12 NOTE — LETTER
6/12/2024      Willy Aquino  [address]  [place] [address]      Dear Colleague,    Thank you for referring your patient, Willy Aquino, to the [facility] [place]. Please see a copy of my visit note below.    ASSESSMENT & PLAN  Patient Instructions     1. Primary osteoarthritis of left knee      -Patient is following up for chronic left knee pain and swelling due to arthritis  -Patient tolerated aspiration and Durolane injection today without complications.  Patient was given postprocedure instructions  -Patient will follow-up when pain and swelling return  -Call direct clinic number [[phone]] at any time with questions or concerns.    Albert Yeo MD [facility] Orthopedics and Sports Medicine  [facility]        -----    SUBJECTIVE:  Willy Aquino is a 63 year old female who is seen for left knee Durolane injection.    Large Joint Injection/Arthocentesis: L knee joint    Date/Time: 6/12/2024 8:05 AM    Performed by: Yeo, Albert, MD  Authorized by: Yeo, Albert, MD    Indications:  Pain and osteoarthritis  Needle Size:  22 G  Guidance: ultrasound    Approach:  Anterolateral  Location:  Knee      Medications:  60 mg sodium hyaluronate 60 MG/3ML; 5 mL lidocaine 1 %  Aspirate amount (mL):  40  Outcome:  Tolerated well, no immediate complications  Procedure discussed: discussed risks, benefits, and alternatives    Consent Given by:  Patient  Timeout: timeout called immediately prior to procedure    Prep: patient was prepped and draped in usual sterile fashion     Ultrasound was used to ensure safe and accurate needle placement and injection. Ultrasound images of the procedure were permanently stored.        Patient rates pain as 6/10 pre-procedure. Patient rates pain as 3/10 post-procedure.      Albert Yeo MD, [facility]      Again, thank you for allowing me to participate in the care of your patient.         Sincerely,        Albert Yeo, MD

## 2024-06-12 NOTE — PATIENT INSTRUCTIONS
1. Primary osteoarthritis of left knee      -Patient is following up for chronic left knee pain and swelling due to arthritis  -Patient tolerated aspiration and Durolane injection today without complications.  Patient was given postprocedure instructions  -Patient will follow-up when pain and swelling return  -Call direct clinic number [[phone]] at any time with questions or concerns.    Albert Yeo MD [facility]  [facility] [place]

## 2024-06-17 ENCOUNTER — HOSPITAL ENCOUNTER (OUTPATIENT)
Facility: CLINIC | Age: 64
Discharge: HOME | End: 2024-06-17
Attending: INTERNAL MEDICINE | Admitting: INTERNAL MEDICINE
Payer: COMMERCIAL

## 2024-06-17 ENCOUNTER — TELEPHONE (OUTPATIENT)
Dept: PEDIATRICS | Facility: CLINIC | Age: 64
End: 2024-06-17
Payer: COMMERCIAL

## 2024-06-17 VITALS
HEART RATE: 59 BPM | OXYGEN SATURATION: 100 % | DIASTOLIC BLOOD PRESSURE: 77 MMHG | SYSTOLIC BLOOD PRESSURE: 120 MMHG | BODY MASS INDEX: 37.22 KG/M2 | WEIGHT: 260 LBS | HEIGHT: 70 IN | RESPIRATION RATE: 16 BRPM

## 2024-06-17 LAB — COLONOSCOPY: NORMAL

## 2024-06-17 PROCEDURE — G0500 MOD SEDAT ENDO SERVICE >5YRS: HCPCS | Mod: PT | Performed by: INTERNAL MEDICINE

## 2024-06-17 PROCEDURE — 250N000013 HC RX MED GY IP 250 OP 250 PS 637: Performed by: INTERNAL MEDICINE

## 2024-06-17 PROCEDURE — 99153 MOD SED SAME PHYS/QHP EA: CPT | Mod: PT | Performed by: INTERNAL MEDICINE

## 2024-06-17 PROCEDURE — 250N000011 HC RX IP 250 OP 636: Performed by: INTERNAL MEDICINE

## 2024-06-17 PROCEDURE — 88305 TISSUE EXAM BY PATHOLOGIST: CPT | Mod: 26 | Performed by: PATHOLOGY

## 2024-06-17 PROCEDURE — 45380 COLONOSCOPY AND BIOPSY: CPT | Mod: PT | Performed by: INTERNAL MEDICINE

## 2024-06-17 PROCEDURE — 88305 TISSUE EXAM BY PATHOLOGIST: CPT | Mod: TC | Performed by: INTERNAL MEDICINE

## 2024-06-17 RX ORDER — NALOXONE HYDROCHLORIDE 0.4 MG/ML
0.4 INJECTION, SOLUTION INTRAMUSCULAR; INTRAVENOUS; SUBCUTANEOUS
Status: DISCONTINUED | OUTPATIENT
Start: 2024-06-17 | End: 2024-06-17 | Stop reason: HOSPADM

## 2024-06-17 RX ORDER — ATROPINE SULFATE 0.1 MG/ML
1 INJECTION INTRAVENOUS
Status: DISCONTINUED | OUTPATIENT
Start: 2024-06-17 | End: 2024-06-17 | Stop reason: HOSPADM

## 2024-06-17 RX ORDER — NALOXONE HYDROCHLORIDE 0.4 MG/ML
0.2 INJECTION, SOLUTION INTRAMUSCULAR; INTRAVENOUS; SUBCUTANEOUS
Status: DISCONTINUED | OUTPATIENT
Start: 2024-06-17 | End: 2024-06-17 | Stop reason: HOSPADM

## 2024-06-17 RX ORDER — DIPHENHYDRAMINE HYDROCHLORIDE 50 MG/ML
25-50 INJECTION INTRAMUSCULAR; INTRAVENOUS
Status: DISCONTINUED | OUTPATIENT
Start: 2024-06-17 | End: 2024-06-17 | Stop reason: HOSPADM

## 2024-06-17 RX ORDER — LIDOCAINE 40 MG/G
CREAM TOPICAL
Status: DISCONTINUED | OUTPATIENT
Start: 2024-06-17 | End: 2024-06-17 | Stop reason: HOSPADM

## 2024-06-17 RX ORDER — ONDANSETRON 4 MG/1
4 TABLET, ORALLY DISINTEGRATING ORAL EVERY 6 HOURS PRN
Status: DISCONTINUED | OUTPATIENT
Start: 2024-06-17 | End: 2024-06-17 | Stop reason: HOSPADM

## 2024-06-17 RX ORDER — SIMETHICONE 40MG/0.6ML
133 SUSPENSION, DROPS(FINAL DOSAGE FORM)(ML) ORAL
Status: COMPLETED | OUTPATIENT
Start: 2024-06-17 | End: 2024-06-17

## 2024-06-17 RX ORDER — FLUMAZENIL 0.1 MG/ML
0.2 INJECTION, SOLUTION INTRAVENOUS
Status: DISCONTINUED | OUTPATIENT
Start: 2024-06-17 | End: 2024-06-17 | Stop reason: HOSPADM

## 2024-06-17 RX ORDER — EPINEPHRINE 1 MG/ML
0.1 INJECTION, SOLUTION INTRAMUSCULAR; SUBCUTANEOUS
Status: DISCONTINUED | OUTPATIENT
Start: 2024-06-17 | End: 2024-06-17 | Stop reason: HOSPADM

## 2024-06-17 RX ORDER — FENTANYL CITRATE 50 UG/ML
50-100 INJECTION, SOLUTION INTRAMUSCULAR; INTRAVENOUS EVERY 5 MIN PRN
Status: DISCONTINUED | OUTPATIENT
Start: 2024-06-17 | End: 2024-06-17 | Stop reason: HOSPADM

## 2024-06-17 RX ORDER — PROCHLORPERAZINE MALEATE 10 MG
10 TABLET ORAL EVERY 6 HOURS PRN
Status: DISCONTINUED | OUTPATIENT
Start: 2024-06-17 | End: 2024-06-17 | Stop reason: HOSPADM

## 2024-06-17 RX ORDER — ONDANSETRON 2 MG/ML
4 INJECTION INTRAMUSCULAR; INTRAVENOUS EVERY 6 HOURS PRN
Status: DISCONTINUED | OUTPATIENT
Start: 2024-06-17 | End: 2024-06-17 | Stop reason: HOSPADM

## 2024-06-17 RX ORDER — ONDANSETRON 2 MG/ML
4 INJECTION INTRAMUSCULAR; INTRAVENOUS
Status: DISCONTINUED | OUTPATIENT
Start: 2024-06-17 | End: 2024-06-17 | Stop reason: HOSPADM

## 2024-06-17 RX ADMIN — MIDAZOLAM 2 MG: 1 INJECTION INTRAMUSCULAR; INTRAVENOUS at 10:31

## 2024-06-17 RX ADMIN — FENTANYL CITRATE 100 MCG: 50 INJECTION, SOLUTION INTRAMUSCULAR; INTRAVENOUS at 10:31

## 2024-06-17 RX ADMIN — MIDAZOLAM 2 MG: 1 INJECTION INTRAMUSCULAR; INTRAVENOUS at 10:35

## 2024-06-17 RX ADMIN — SIMETHICONE 133 MG: 20 SUSPENSION/ DROPS ORAL at 10:44

## 2024-06-17 RX ADMIN — FENTANYL CITRATE 50 MCG: 50 INJECTION, SOLUTION INTRAMUSCULAR; INTRAVENOUS at 10:38

## 2024-06-17 ASSESSMENT — ACTIVITIES OF DAILY LIVING (ADL)
ADLS_ACUITY_SCORE: 35
ADLS_ACUITY_SCORE: 35

## 2024-06-17 NOTE — H&P
[facility] Anesthesia Pre-op History and Physical    Willy Aquino MRN# [id number]   Age: 63 year old YOB: 1960      Date of Surgery: [facility]      Date of Exam 6/17/2024 Facility (Same day)       Home clinic: unknown  Primary care provider: Андрей Richardson         Chief Complaint and/or Reason for Procedure:   No chief complaint on file.           Active problem list:     Patient Active Problem List    Diagnosis Date Noted    Left knee pain, unspecified chronicity 10/17/2023     Priority: Medium    Anemia 11/08/2012     Priority: Medium    Asthma 11/08/2012     Priority: Medium    Wears glasses 11/08/2012     Priority: Medium    Snores 11/08/2012     Priority: Medium    Bruxism 11/08/2012     Priority: Medium    Right knee pain 11/08/2012     Priority: Medium    Atypical face pain 08/14/2012     Priority: Medium    Headache 08/14/2012     Priority: Medium     Problem list name updated by automated process. Provider to review and confirm  Problem list name updated by automated process. Provider to review      Disorder of jaw 08/14/2012     Priority: Medium    TMJ (temporomandibular joint syndrome) 08/14/2012     Priority: Medium            Medications (include herbals and vitamins):   Any Plavix use in the last 7 days? No     Current Facility-Administered Medications   Medication Dose Route Frequency Provider Last Rate Last Admin    atropine injection 1 mg  1 mg Intravenous Once PRN Shira Alonso MD        benzocaine 20% (HURRICAINE/TOPEX) 20 % spray 0.5 mL  1 spray Mouth/Throat Once PRN Shira Alonso MD        diphenhydrAMINE (BENADRYL) injection 25-50 mg  25-50 mg Intravenous Once PRN Shira Alonso MD        EPINEPHrine (Anaphylaxis) (ADRENALIN) injection (vial) 0.1 mg  0.1 mg Submucosal Once PRN Shira Alonso MD        fentaNYL (PF) (SUBLIMAZE) injection  mcg   mcg Intravenous Q5 Min PRShira Gibbs MD        flumazenil  "(ROMAZICON) injection 0.2 mg  0.2 mg Intravenous q1 min prn Shira Alonso MD        glucagon injection 0.5 mg  0.5 mg Intravenous Once PRShira Gibbs MD        midazolam (VERSED) injection 0.5-2 mg  0.5-2 mg Intravenous Q4 Min PRN Shira Alonso MD        naloxone (NARCAN) injection 0.2 mg  0.2 mg Intravenous Q2 Min PRShira Gibbs MD        Or    naloxone (NARCAN) injection 0.4 mg  0.4 mg Intravenous Q2 Min PRShira Gibbs MD        Or    naloxone (NARCAN) injection 0.2 mg  0.2 mg Intramuscular Q2 Min Shira Cortes MD        Or    naloxone (NARCAN) injection 0.4 mg  0.4 mg Intramuscular Q2 Min PRShira Gibbs MD        simethicone (MYLICON) suspension 133 mg  133 mg Oral Once PRShira Gibbs MD        sodium chloride (PF) 0.9% PF flush 3 mL  3 mL Intravenous q1 min prShira Gibbs MD        sodium chloride 0.9% BOLUS 500 mL  500 mL Intravenous Once PRShira Gibbs MD                 Allergies:      Allergies   Allergen Reactions    Chloroquine Hives     Allergy to Latex? No  Allergy to tape?   No  Intolerances: chloroquine            Physical Exam:   All vitals have been reviewed  Patient Vitals for the past 8 hrs:   BP Pulse Resp SpO2 Height Weight   06/17/24 1020 114/66 58 14 100 % -- --   06/17/24 1018 125/69 59 16 98 % -- --   06/17/24 1013 -- -- -- -- 1.778 m (5' 10\") 117.9 kg (260 lb)     No intake/output data recorded.  Airway assessment:   Patient is able to open mouth wide  Patient is able to stick out tongue}              Lab / Radiology Results:             Anesthetic risk and/or ASA classification:       Shira Alonso MD      "

## 2024-06-17 NOTE — TELEPHONE ENCOUNTER
Called pt at [phone], not available, so left message explaining result. Also mentioned that biopsy result is yet to come. Advised her to call us back at [phone] with any questions.    Tiffanie Vitale RN  [facility]

## 2024-06-17 NOTE — TELEPHONE ENCOUNTER
----- Message from Sophia Abreu PA-C sent at 6/17/2024  1:38 PM CDT -----  Call patient. Repeat colonoscopy in 3 years.   Sophia Abreu PA-C

## 2024-06-18 LAB
PATH REPORT.COMMENTS IMP SPEC: NORMAL
PATH REPORT.COMMENTS IMP SPEC: NORMAL
PATH REPORT.FINAL DX SPEC: NORMAL
PATH REPORT.GROSS SPEC: NORMAL
PATH REPORT.MICROSCOPIC SPEC OTHER STN: NORMAL
PATH REPORT.RELEVANT HX SPEC: NORMAL
PHOTO IMAGE: NORMAL

## 2024-08-29 ENCOUNTER — PATIENT OUTREACH (OUTPATIENT)
Dept: CARE COORDINATION | Facility: CLINIC | Age: 64
End: 2024-08-29
Payer: COMMERCIAL

## 2024-09-04 ENCOUNTER — OFFICE VISIT (OUTPATIENT)
Dept: PEDIATRICS | Facility: CLINIC | Age: 64
End: 2024-09-04
Payer: COMMERCIAL

## 2024-09-04 VITALS
HEIGHT: 69 IN | DIASTOLIC BLOOD PRESSURE: 64 MMHG | OXYGEN SATURATION: 96 % | BODY MASS INDEX: 41.53 KG/M2 | TEMPERATURE: 98.1 F | SYSTOLIC BLOOD PRESSURE: 118 MMHG | WEIGHT: 280.4 LBS | HEART RATE: 60 BPM | RESPIRATION RATE: 18 BRPM

## 2024-09-04 DIAGNOSIS — Z13.220 SCREENING FOR HYPERLIPIDEMIA: ICD-10-CM

## 2024-09-04 DIAGNOSIS — E66.01 CLASS 3 SEVERE OBESITY DUE TO EXCESS CALORIES WITH BODY MASS INDEX (BMI) OF 40.0 TO 44.9 IN ADULT, UNSPECIFIED WHETHER SERIOUS COMORBIDITY PRESENT (H): ICD-10-CM

## 2024-09-04 DIAGNOSIS — R73.03 PREDIABETES: ICD-10-CM

## 2024-09-04 DIAGNOSIS — J45.909 ASTHMA, UNSPECIFIED ASTHMA SEVERITY, UNSPECIFIED WHETHER COMPLICATED, UNSPECIFIED WHETHER PERSISTENT: ICD-10-CM

## 2024-09-04 DIAGNOSIS — R60.0 BILATERAL LOWER EXTREMITY EDEMA: ICD-10-CM

## 2024-09-04 DIAGNOSIS — E66.813 CLASS 3 SEVERE OBESITY DUE TO EXCESS CALORIES WITH BODY MASS INDEX (BMI) OF 40.0 TO 44.9 IN ADULT, UNSPECIFIED WHETHER SERIOUS COMORBIDITY PRESENT (H): ICD-10-CM

## 2024-09-04 DIAGNOSIS — Z00.00 ROUTINE GENERAL MEDICAL EXAMINATION AT A HEALTH CARE FACILITY: Primary | ICD-10-CM

## 2024-09-04 DIAGNOSIS — R22.9 SKIN MASS: ICD-10-CM

## 2024-09-04 LAB — HBA1C MFR BLD: 5.6 % (ref 0–5.6)

## 2024-09-04 PROCEDURE — 80053 COMPREHEN METABOLIC PANEL: CPT | Performed by: STUDENT IN AN ORGANIZED HEALTH CARE EDUCATION/TRAINING PROGRAM

## 2024-09-04 PROCEDURE — 80061 LIPID PANEL: CPT | Performed by: STUDENT IN AN ORGANIZED HEALTH CARE EDUCATION/TRAINING PROGRAM

## 2024-09-04 PROCEDURE — 36415 COLL VENOUS BLD VENIPUNCTURE: CPT | Performed by: STUDENT IN AN ORGANIZED HEALTH CARE EDUCATION/TRAINING PROGRAM

## 2024-09-04 PROCEDURE — 83036 HEMOGLOBIN GLYCOSYLATED A1C: CPT | Performed by: STUDENT IN AN ORGANIZED HEALTH CARE EDUCATION/TRAINING PROGRAM

## 2024-09-04 PROCEDURE — 99214 OFFICE O/P EST MOD 30 MIN: CPT | Mod: 25 | Performed by: STUDENT IN AN ORGANIZED HEALTH CARE EDUCATION/TRAINING PROGRAM

## 2024-09-04 PROCEDURE — 99396 PREV VISIT EST AGE 40-64: CPT | Mod: 25 | Performed by: STUDENT IN AN ORGANIZED HEALTH CARE EDUCATION/TRAINING PROGRAM

## 2024-09-04 PROCEDURE — 90656 IIV3 VACC NO PRSV 0.5 ML IM: CPT | Performed by: STUDENT IN AN ORGANIZED HEALTH CARE EDUCATION/TRAINING PROGRAM

## 2024-09-04 PROCEDURE — 90471 IMMUNIZATION ADMIN: CPT | Performed by: STUDENT IN AN ORGANIZED HEALTH CARE EDUCATION/TRAINING PROGRAM

## 2024-09-04 RX ORDER — FLUTICASONE PROPIONATE AND SALMETEROL 500; 50 UG/1; UG/1
1 POWDER RESPIRATORY (INHALATION) EVERY 12 HOURS
Qty: 60 EACH | Refills: 3 | Status: SHIPPED | OUTPATIENT
Start: 2024-09-04

## 2024-09-04 RX ORDER — ALBUTEROL SULFATE 90 UG/1
1-2 AEROSOL, METERED RESPIRATORY (INHALATION) EVERY 4 HOURS PRN
Qty: 8.5 G | Refills: 0 | Status: SHIPPED | OUTPATIENT
Start: 2024-09-04

## 2024-09-04 ASSESSMENT — ASTHMA QUESTIONNAIRES
ACT_TOTALSCORE: 22
ACT_TOTALSCORE: 22
QUESTION_5 LAST FOUR WEEKS HOW WOULD YOU RATE YOUR ASTHMA CONTROL: SOMEWHAT CONTROLLED
QUESTION_4 LAST FOUR WEEKS HOW OFTEN HAVE YOU USED YOUR RESCUE INHALER OR NEBULIZER MEDICATION (SUCH AS ALBUTEROL): ONCE A WEEK OR LESS
QUESTION_2 LAST FOUR WEEKS HOW OFTEN HAVE YOU HAD SHORTNESS OF BREATH: NOT AT ALL
QUESTION_1 LAST FOUR WEEKS HOW MUCH OF THE TIME DID YOUR ASTHMA KEEP YOU FROM GETTING AS MUCH DONE AT WORK, SCHOOL OR AT HOME: NONE OF THE TIME
QUESTION_3 LAST FOUR WEEKS HOW OFTEN DID YOUR ASTHMA SYMPTOMS (WHEEZING, COUGHING, SHORTNESS OF BREATH, CHEST TIGHTNESS OR PAIN) WAKE YOU UP AT NIGHT OR EARLIER THAN USUAL IN THE MORNING: NOT AT ALL

## 2024-09-04 ASSESSMENT — PAIN SCALES - GENERAL: PAINLEVEL: NO PAIN (0)

## 2024-09-04 NOTE — PROGRESS NOTES
Preventive Care Visit  [facility] LEN Luque MD, Internal Medicine  Sep 4, 2024      Assessment & Plan     Routine general medical examination at a health care facility  Presents today for routine annual and concerns as below:    Class 3 severe obesity due to excess calories with body mass index (BMI) of 40.0 to 44.9 in adult, unspecified whether serious comorbidity present (H)  - Comprehensive metabolic panel (BMP + Alb, Alk Phos, ALT, AST, Total. Bili, TP); Future  - Comprehensive metabolic panel (BMP + Alb, Alk Phos, ALT, AST, Total. Bili, TP)    Prediabetes  Last A1c 6.0.  Will obtain repeat today   - Hemoglobin A1c; Future  - Hemoglobin A1c    Asthma, unspecified asthma severity, unspecified whether complicated, unspecified whether persistent      9/28/2023     8:32 AM 9/4/2024     1:30 PM   ACT Total Scores   ACT TOTAL SCORE (Goal Greater than or Equal to 20) 24 22   In the past 12 months, how many times did you visit the emergency room for your asthma without being admitted to the hospital? 0 0   In the past 12 months, how many times were you hospitalized overnight because of your asthma? 0 0     Asthma currently well controlled.  Refilly   - fluticasone-salmeterol (ADVAIR) 500-50 MCG/ACT inhaler; Inhale 1 puff into the lungs every 12 hours. Needs appointment with Dr. Elena before next refill.  - albuterol (PROAIR HFA/PROVENTIL HFA/VENTOLIN HFA) 108 (90 Base) MCG/ACT inhaler; Inhale 1-2 puffs into the lungs every 4 hours as needed for shortness of breath, wheezing or cough.    Screening for hyperlipidemia  - Lipid panel reflex to direct LDL Fasting; Future  - Lipid panel reflex to direct LDL Fasting    Skin mass  Notes a few skin masses one on right forearm and the other on left thigh.  She is bothered especially by the mass on her left thigh.  These are longstanding. She was referred to surgery in the past for removal but this was delayed due to insurance.  She is interested in  "linnea back to surgery to discuss potential removal  - Adult Gen Surg  Referral; Future    Bilateral lower extremity edema  Notes bothersome longstanding lower extremity edema that is chronic, but seems to be slowly worsening. Improves with elevation at night.  No associated cardiac or respiratory symptoms and no clear medications contributing.  Suspect that this may be multifactorial in the setting of obesity and venous stasis.  Will obtain cmp to assess LFTs and creatinine for potential etiologies.           BMI  Estimated body mass index is 41.71 kg/m  as calculated from the following:    Height as of this encounter: 1.746 m (5' 8.75\").    Weight as of this encounter: 127.2 kg (280 lb 6.4 oz).       Counseling  Appropriate preventive services were addressed with this patient via screening, questionnaire, or discussion as appropriate for fall prevention, nutrition, physical activity, Tobacco-use cessation, social engagement, weight loss and cognition.  Checklist reviewing preventive services available has been given to the patient.  Reviewed patient's diet, addressing concerns and/or questions.   She is at risk for lack of exercise and has been provided with information to increase physical activity for the benefit of her well-being.           Tom Aguilera is a 64 year old, presenting for the following:  Physical        9/4/2024     1:21 PM   Additional Questions   Roomed by Karen Tovar CMA        Health Care Directive  Patient does not have a Health Care Directive or Living Will: Discussed advance care planning with patient; however, patient declined at this time.    HPI  FASTING today  Wants to discuss intermittent sweliing in lower extremities  Renew asthma meds  Lumps under skin on arm and hip  Neck cracking    Notes leg swelling almost daily   Improves in the evening with elevation  No swelling elsewhere   No chest pain or shortness of breath   Back to normal if elevated by the morning   No " redness or warmth   Uses compression stockings with traveling which is somewhat helpful   No changes to salt intake     Going on for year       9/28/2023     8:32 AM 9/4/2024     1:30 PM   ACT Total Scores   ACT TOTAL SCORE (Goal Greater than or Equal to 20) 24 22   In the past 12 months, how many times did you visit the emergency room for your asthma without being admitted to the hospital? 0 0   In the past 12 months, how many times were you hospitalized overnight because of your asthma? 0 0               9/4/2024   General Health   How would you rate your overall physical health? Good   Feel stress (tense, anxious, or unable to sleep) Not at all            9/4/2024   Nutrition   Three or more servings of calcium each day? (!) NO   Diet: Regular (no restrictions)   How many servings of fruit and vegetables per day? (!) 0-1   How many sweetened beverages each day? 0-1            9/4/2024   Exercise   Days per week of moderate/strenous exercise 1 day      (!) EXERCISE CONCERN      9/4/2024   Social Factors   Frequency of gathering with friends or relatives More than three times a week   Worry food won't last until get money to buy more No   Food not last or not have enough money for food? No   Do you have housing? (Housing is defined as stable permanent housing and does not include staying ouside in a car, in a tent, in an abandoned building, in an overnight shelter, or couch-surfing.) Yes   Are you worried about losing your housing? No   Lack of transportation? No   Unable to get utilities (heat,electricity)? No            9/4/2024   Fall Risk   Fallen 2 or more times in the past year? No   Trouble with walking or balance? Yes   Gait Speed Test (Document in seconds) 4.1   Gait Speed Test Interpretation Less than or equal to 5.00 seconds - PASS      Walking better since getting gel injection        9/4/2024   Dental   Dentist two times every year? Yes            9/4/2024   TB Screening   Were you born outside of the  [place]? Yes              Today's PHQ-2 Score:       1/11/2024     9:06 AM   PHQ-2 ( 1999 [employer])   Q1: Little interest or pleasure in doing things 0   Q2: Feeling down, depressed or hopeless 0   PHQ-2 Score 0         9/4/2024   Substance Use   Alcohol more than 3/day or more than 7/wk No   Do you use any other substances recreationally? No        Social History     Tobacco Use    Smoking status: Never    Smokeless tobacco: Never   Vaping Use    Vaping status: Never Used   Substance Use Topics    Alcohol use: Yes     Comment: occasional    Drug use: Never          Mammogram Screening - Mammogram every 1-2 years updated in Health Maintenance based on mutual decision making          9/4/2024   One time HIV Screening   Previous HIV test? No          9/4/2024   STI Screening   New sexual partner(s) since last STI/HIV test? No        History of abnormal Pap smear: No - age 30- 64 PAP with HPV every 5 years recommended       ASCVD Risk   The 10-year ASCVD risk score (Andre GUERRA, et al., 2019) is: 3.9%    Values used to calculate the score:      Age: 64 years      Sex: Female      Is Non- : Yes      Diabetic: No      Tobacco smoker: No      Systolic Blood Pressure: 118 mmHg      Is BP treated: No      HDL Cholesterol: 81 mg/dL      Total Cholesterol: 186 mg/dL           Reviewed and updated as needed this visit by Provider                             Objective    Exam  /64 (BP Location: Right arm, Cuff Size: Adult Large)   Pulse 60   Temp 98.1  F (36.7  C) (Tympanic)   Resp 18   Ht 1.746 m (5' 8.75\")   Wt 127.2 kg (280 lb 6.4 oz)   SpO2 96%   BMI 41.71 kg/m     Estimated body mass index is 41.71 kg/m  as calculated from the following:    Height as of this encounter: 1.746 m (5' 8.75\").    Weight as of this encounter: 127.2 kg (280 lb 6.4 oz).    Physical Exam  GENERAL: alert and no distress  EYES: Eyes grossly normal to inspection, PERRL and conjunctivae and sclerae normal  HENT: ear " canals and TM's normal, nose and mouth without ulcers or lesions  NECK: no adenopathy, no asymmetry, masses, or scars  RESP: lungs clear to auscultation - no rales, rhonchi or wheezes  BREAST: normal without masses, tenderness or nipple discharge and no palpable axillary masses or adenopathy  CV: regular rate and rhythm, normal S1 S2, no S3 or S4, no murmur, click or rub, no peripheral edema  ABDOMEN: soft, nontender, no hepatosplenomegaly, no masses  MS: no gross musculoskeletal defects noted, bilateral lower extremity edema   SKIN: no suspicious lesions or rashes  NEURO: Normal strength and tone, mentation intact and speech normal  PSYCH: mentation appears normal, affect normal/bright        Signed Electronically by: Jennifer Luque MD

## 2024-09-04 NOTE — LETTER
"September 5, 2024      Willy Aquino  [address]  [place] [address]        Dear Willy,     Happy to report that overall all your labs looked good.  Your metabolic panel (kidney function, electrolytes, liver enzymes) was normal. The cholesterol levels are just a little high, specifically the LDL or \"bad\" cholesterol but not in a range where I would recommend a cholesterol medication.  Your A1c was back in the normal range and better than last year, which is great!     Please feel free to reach out with any questions or concerns.     Thank you,   Jennifer Luque MD     Resulted Orders   Hemoglobin A1c   Result Value Ref Range    Hemoglobin A1C 5.6 0.0 - 5.6 %      Comment:      Normal <5.7%   Prediabetes 5.7-6.4%    Diabetes 6.5% or higher     Note: Adopted from ADA consensus guidelines.   Comprehensive metabolic panel (BMP + Alb, Alk Phos, ALT, AST, Total. Bili, TP)   Result Value Ref Range    Sodium 141 135 - 145 mmol/L    Potassium 3.9 3.4 - 5.3 mmol/L    Carbon Dioxide (CO2) 26 22 - 29 mmol/L    Anion Gap 9 7 - 15 mmol/L    Urea Nitrogen 7.5 (L) 8.0 - 23.0 mg/dL    Creatinine 0.72 0.51 - 0.95 mg/dL    GFR Estimate >90 >60 mL/min/1.73m2      Comment:      eGFR calculated using 2021 CKD-EPI equation.    Calcium 9.0 8.8 - 10.4 mg/dL      Comment:      Reference intervals for this test were updated on 7/16/2024 to reflect our healthy population more accurately. There may be differences in the flagging of prior results with similar values performed with this method. Those prior results can be interpreted in the context of the updated reference intervals.    Chloride 106 98 - 107 mmol/L    Glucose 97 70 - 99 mg/dL    Alkaline Phosphatase 44 40 - 150 U/L    AST 21 0 - 45 U/L    ALT 10 0 - 50 U/L    Protein Total 7.0 6.4 - 8.3 g/dL    Albumin 4.2 3.5 - 5.2 g/dL    Bilirubin Total 0.3 <=1.2 mg/dL    Patient Fasting > 8hrs? Yes    Lipid panel reflex to direct LDL Fasting   Result Value Ref Range    " Cholesterol 201 (H) <200 mg/dL    Triglycerides 38 <150 mg/dL    Direct Measure HDL 75 >=50 mg/dL    LDL Cholesterol Calculated 118 (H) <=100 mg/dL    Non HDL Cholesterol 126 <130 mg/dL    Patient Fasting > 8hrs? Yes     Narrative    Cholesterol  Desirable:  <200 mg/dL    Triglycerides  Normal:  Less than 150 mg/dL  Borderline High:  150-199 mg/dL  High:  200-499 mg/dL  Very High:  Greater than or equal to 500 mg/dL    Direct Measure HDL  Female:  Greater than or equal to 50 mg/dL   Male:  Greater than or equal to 40 mg/dL    LDL Cholesterol  Desirable:  <100mg/dL  Above Desirable:  100-129 mg/dL   Borderline High:  130-159 mg/dL   High:  160-189 mg/dL   Very High:  >= 190 mg/dL    Non HDL Cholesterol  Desirable:  130 mg/dL  Above Desirable:  130-159 mg/dL  Borderline High:  160-189 mg/dL  High:  190-219 mg/dL  Very High:  Greater than or equal to 220 mg/dL

## 2024-09-04 NOTE — PATIENT INSTRUCTIONS
Patient Education   Preventive Care Advice   This is general advice given by our system to help you stay healthy. However, your care team may have specific advice just for you. Please talk to your care team about your preventive care needs.  Nutrition  Eat 5 or more servings of fruits and vegetables each day.  Try wheat bread, brown rice and whole grain pasta (instead of white bread, rice, and pasta).  Get enough calcium and vitamin D. Check the label on foods and aim for 100% of the RDA (recommended daily allowance).  Lifestyle  Exercise at least 150 minutes each week  (30 minutes a day, 5 days a week).  Do muscle strengthening activities 2 days a week. These help control your weight and prevent disease.  No smoking.  Wear sunscreen to prevent skin cancer.  Have a dental exam and cleaning every 6 months.  Yearly exams  See your health care team every year to talk about:  Any changes in your health.  Any medicines your care team has prescribed.  Preventive care, family planning, and ways to prevent chronic diseases.  Shots (vaccines)   HPV shots (up to age 26), if you've never had them before.  Hepatitis B shots (up to age 59), if you've never had them before.  COVID-19 shot: Get this shot when it's due.  Flu shot: Get a flu shot every year.  Tetanus shot: Get a tetanus shot every 10 years.  Pneumococcal, hepatitis A, and RSV shots: Ask your care team if you need these based on your risk.  Shingles shot (for age 50 and up)  General health tests  Diabetes screening:  Starting at age 35, Get screened for diabetes at least every 3 years.  If you are younger than age 35, ask your care team if you should be screened for diabetes.  Cholesterol test: At age 39, start having a cholesterol test every 5 years, or more often if advised.  Bone density scan (DEXA): At age 50, ask your care team if you should have this scan for osteoporosis (brittle bones).  Hepatitis C: Get tested at least once in your life.  STIs (sexually  transmitted infections)  Before age 24: Ask your care team if you should be screened for STIs.  After age 24: Get screened for STIs if you're at risk. You are at risk for STIs (including HIV) if:  You are sexually active with more than one person.  You don't use condoms every time.  You or a partner was diagnosed with a sexually transmitted infection.  If you are at risk for HIV, ask about PrEP medicine to prevent HIV.  Get tested for HIV at least once in your life, whether you are at risk for HIV or not.  Cancer screening tests  Cervical cancer screening: If you have a cervix, begin getting regular cervical cancer screening tests starting at age 21.  Breast cancer scan (mammogram): If you've ever had breasts, begin having regular mammograms starting at age 40. This is a scan to check for breast cancer.  Colon cancer screening: It is important to start screening for colon cancer at age 45.  Have a colonoscopy test every 10 years (or more often if you're at risk) Or, ask your provider about stool tests like a FIT test every year or Cologuard test every 3 years.  To learn more about your testing options, visit:   .  For help making a decision, visit:   https://bit.ly/qe69532.  Prostate cancer screening test: If you have a prostate, ask your care team if a prostate cancer screening test (PSA) at age 55 is right for you.  Lung cancer screening: If you are a current or former smoker ages 50 to 80, ask your care team if ongoing lung cancer screenings are right for you.  For informational purposes only. Not to replace the advice of your health care provider. Copyright   2023 [facility]. All rights reserved. Clinically reviewed by the [facility] Transitions Program. [employer] 532048 - REV 01/24.  Preventing Falls: Care Instructions  Injuries and health problems such as trouble walking or poor eyesight can increase your risk of falling. So can some medicines. But there are things you can do to help  "prevent falls. You can exercise to get stronger. You can also arrange your home to make it safer.    Talk to your doctor about the medicines you take. Ask if any of them increase the risk of falls and whether they can be changed or stopped.   Try to exercise regularly. It can help improve your strength and balance. This can help lower your risk of falling.     Practice fall safety and prevention.    Wear low-heeled shoes that fit well and give your feet good support. Talk to your doctor if you have foot problems that make this hard.  Carry a cellphone or wear a medical alert device that you can use to call for help.  Use stepladders instead of chairs to reach high objects. Don't climb if you're at risk for falls. Ask for help, if needed.  Wear the correct eyeglasses, if you need them.    Make your home safer.    Remove rugs, cords, clutter, and furniture from walkways.  Keep your house well lit. Use night-lights in hallways and bathrooms.  Install and use sturdy handrails on stairways.  Wear nonskid footwear, even inside. Don't walk barefoot or in socks without shoes.    Be safe outside.    Use handrails, curb cuts, and ramps whenever possible.  Keep your hands free by using a shoulder bag or backpack.  Try to walk in well-lit areas. Watch out for uneven ground, changes in pavement, and debris.  Be careful in the winter. Walk on the grass or gravel when sidewalks are slippery. Use de-icer on steps and walkways. Add non-slip devices to shoes.    Put grab bars and nonskid mats in your shower or tub and near the toilet. Try to use a shower chair or bath bench when bathing.   Get into a tub or shower by putting in your weaker leg first. Get out with your strong side first. Have a phone or medical alert device in the bathroom with you.   Where can you learn more?  Go to https://www.Water[employer].net/patiented  Enter G117 in the search box to learn more about \"Preventing Falls: Care Instructions.\"  Current as of: July 17, " 2023               Content Version: 14.0    8392-3637 [employer].   Care instructions adapted under license by your healthcare professional. If you have questions about a medical condition or this instruction, always ask your healthcare professional. [employer] disclaims any warranty or liability for your use of this information.

## 2024-09-05 LAB
ALBUMIN SERPL BCG-MCNC: 4.2 G/DL (ref 3.5–5.2)
ALP SERPL-CCNC: 44 U/L (ref 40–150)
ALT SERPL W P-5'-P-CCNC: 10 U/L (ref 0–50)
ANION GAP SERPL CALCULATED.3IONS-SCNC: 9 MMOL/L (ref 7–15)
AST SERPL W P-5'-P-CCNC: 21 U/L (ref 0–45)
BILIRUB SERPL-MCNC: 0.3 MG/DL
BUN SERPL-MCNC: 7.5 MG/DL (ref 8–23)
CALCIUM SERPL-MCNC: 9 MG/DL (ref 8.8–10.4)
CHLORIDE SERPL-SCNC: 106 MMOL/L (ref 98–107)
CHOLEST SERPL-MCNC: 201 MG/DL
CREAT SERPL-MCNC: 0.72 MG/DL (ref 0.51–0.95)
EGFRCR SERPLBLD CKD-EPI 2021: >90 ML/MIN/1.73M2
FASTING STATUS PATIENT QL REPORTED: YES
FASTING STATUS PATIENT QL REPORTED: YES
GLUCOSE SERPL-MCNC: 97 MG/DL (ref 70–99)
HCO3 SERPL-SCNC: 26 MMOL/L (ref 22–29)
HDLC SERPL-MCNC: 75 MG/DL
LDLC SERPL CALC-MCNC: 118 MG/DL
NONHDLC SERPL-MCNC: 126 MG/DL
POTASSIUM SERPL-SCNC: 3.9 MMOL/L (ref 3.4–5.3)
PROT SERPL-MCNC: 7 G/DL (ref 6.4–8.3)
SODIUM SERPL-SCNC: 141 MMOL/L (ref 135–145)
TRIGL SERPL-MCNC: 38 MG/DL

## 2024-10-21 ENCOUNTER — ANCILLARY PROCEDURE (OUTPATIENT)
Dept: MAMMOGRAPHY | Facility: CLINIC | Age: 64
End: 2024-10-21
Attending: INTERNAL MEDICINE
Payer: COMMERCIAL

## 2024-10-21 PROCEDURE — 77063 BREAST TOMOSYNTHESIS BI: CPT | Mod: TC | Performed by: RADIOLOGY

## 2024-10-21 PROCEDURE — 77067 SCR MAMMO BI INCL CAD: CPT | Mod: TC | Performed by: RADIOLOGY

## 2025-01-13 ENCOUNTER — TELEPHONE (OUTPATIENT)
Dept: ORTHOPEDICS | Facility: CLINIC | Age: 65
End: 2025-01-13
Payer: COMMERCIAL

## 2025-01-13 DIAGNOSIS — M17.12 PRIMARY OSTEOARTHRITIS OF LEFT KNEE: Primary | ICD-10-CM

## 2025-01-13 NOTE — TELEPHONE ENCOUNTER
Upon chart review, patient rescheduled appt to 2/3/25. No further information was provided.    Phone call to patient to discuss how long pain relief lasted after her last gel injection on 6/12/24.    LVM requesting call back. Also asked if a detailed message can be left at this number.    Otilia Montes ATC

## 2025-01-13 NOTE — TELEPHONE ENCOUNTER
Patient is scheduled for appt on 1/15 to discuss left knee pain and possible injection. Upon chart review, patient was last seen on 6/12/24 and had left knee Durolane injection. Patient was seen on 5/15/24 and had left knee cortisone injection.    Phone call to patient to discuss which type of injection she is hoping to receive. If patient is wanting to repeat the Durolane (gel) injection, her appointment will need to be rescheduled for 1-2 weeks out to allow time for PA approval. Will also need to know how long pain relief lasted after her last gel injection on 6/12/24.    If patient would like to repeat the cortisone injection, ok to keep appt as scheduled.    LVM for return call to discuss.     Phone Number patient can be reached at:  Cell number on file:    Telephone Information:   Mobile [phone]     Can we leave a detailed message on this number:  NORMA Montes ATC

## 2025-01-15 NOTE — TELEPHONE ENCOUNTER
I called the patient and spoke with her regarding questions for her prior authorization.  - She got 6.5 months of relief from her last gel injection done on 6/15/2024, citing that it wore off at the end of December 2024.  - She takes ibuprofen as needed for pain currently, 300 or 600 mg twice a day.  -She has previously completed a trial of formal physical therapy without much relief.  - She did not get relief from trialing corticoids steroid injections previously.    Please place or update prior Auth as needed.    Cristóbal Buckner, ATC

## 2025-01-15 NOTE — TELEPHONE ENCOUNTER
Patient scheduled for appointment on 2/3/25 @ [facility] for discussion of viscosupplementation injection vs steroid injection of left knee.      Durolane injection last completed 6/12/24.  Patient reports relief for 6.5 months    Patient has failed 3 month trial of Pharmacologic Approach (e.g., topical NSAIDs, oral NSAIDs with or without oral proton pump inhibitors, CALLAHAN-2 inhibitors, topical capsaicin, acetaminophen, tramadol, duloxetine, etc.):  Yes     Patient has completed 3 month trial of Non-Pharmacologic treatments (i.e., physical, psychosocial, or mind-body approach (e.g., exercise-land based or aquatic, physical therapy, val chi, yoga, weight management, cognitive behavioral therapy, knee brace or cane, etc).  Yes    Has patient had prior reaction to Synvisc/SynviscOne or any alternative HA product?: No    Prior authorization referral for Durolane injection pended.    Otilia Monets ATC

## 2025-01-15 NOTE — TELEPHONE ENCOUNTER
Called patient and spoke with her, she would like a callback at 12:00 or so. She is at work and can call at lunch.    Cristóbal Buckner ATC

## 2025-02-04 NOTE — PROGRESS NOTES
ASSESSMENT & PLAN  Patient Instructions     1. Primary osteoarthritis of left knee      -Patient for chronic left knee pain and swelling due to arthritis  -Patient tolerated left knee intra-articular Durolane injection today without complications.  Patient was given postprocedure instructions  -Patient will follow-up when pain returns  -Call direct clinic number [[phone]] at any time with questions or concerns.    Albert Yeo MD [facility]  [facility]        -----    SUBJECTIVE:  Willy Aquino is a 64 year old female who is seen for left knee Durolane Injection . Patient was last seen on 06/12/2024 and had left knee Durolane injection. Patient reports relief lasting ~ 6.5 months.    Patient rates pain as 7/10 pre-procedure. Patient rates pain as 4/10 post-procedure.    Large Joint Injection/Arthocentesis: L knee joint    Date/Time: 2/5/2025 5:09 PM    Performed by: Yeo, Albert, MD  Authorized by: Yeo, Albert, MD    Indications:  Pain and osteoarthritis  Needle Size:  22 G  Guidance: ultrasound    Approach:  Superolateral  Location:  Knee      Medications:  60 mg sodium hyaluronate 60 MG/3ML; 5 mL lidocaine 1 %  Aspirate amount (mL):  18  Aspirate:  Yellow  Outcome:  Tolerated well, no immediate complications  Procedure discussed: discussed risks, benefits, and alternatives    Consent Given by:  Patient  Timeout: timeout called immediately prior to procedure    Prep: patient was prepped and draped in usual sterile fashion     Ultrasound was used to ensure safe and accurate needle placement and injection. Ultrasound images of the procedure were permanently stored.          Albert Yeo MD, [facility]

## 2025-02-05 ENCOUNTER — OFFICE VISIT (OUTPATIENT)
Dept: ORTHOPEDICS | Facility: CLINIC | Age: 65
End: 2025-02-05
Payer: COMMERCIAL

## 2025-02-05 DIAGNOSIS — M17.12 PRIMARY OSTEOARTHRITIS OF LEFT KNEE: Primary | ICD-10-CM

## 2025-02-05 PROCEDURE — 20611 DRAIN/INJ JOINT/BURSA W/US: CPT | Mod: LT | Performed by: FAMILY MEDICINE

## 2025-02-05 RX ORDER — LIDOCAINE HYDROCHLORIDE 10 MG/ML
5 INJECTION, SOLUTION INFILTRATION; PERINEURAL
Status: COMPLETED | OUTPATIENT
Start: 2025-02-05 | End: 2025-02-05

## 2025-02-05 RX ADMIN — LIDOCAINE HYDROCHLORIDE 5 ML: 10 INJECTION, SOLUTION INFILTRATION; PERINEURAL at 17:09

## 2025-02-05 NOTE — PATIENT INSTRUCTIONS
1. Primary osteoarthritis of left knee      -Patient for chronic left knee pain and swelling due to arthritis  -Patient tolerated left knee intra-articular Durolane injection today without complications.  Patient was given postprocedure instructions  -Patient will follow-up when pain returns  -Call direct clinic number [[phone]] at any time with questions or concerns.    Albert Yeo MD [facility]  [facility]

## 2025-02-05 NOTE — LETTER
2/5/2025      Willy Aquino  [address]  [place] [address]      Dear Colleague,    Thank you for referring your patient, Willy Aquino, to the [facility] [place]. Please see a copy of my visit note below.    ASSESSMENT & PLAN  Patient Instructions     1. Primary osteoarthritis of left knee      -Patient for chronic left knee pain and swelling due to arthritis  -Patient tolerated left knee intra-articular Durolane injection today without complications.  Patient was given postprocedure instructions  -Patient will follow-up when pain returns  -Call direct clinic number [[phone]] at any time with questions or concerns.    Albert Yeo MD [facility]  [facility]        -----    SUBJECTIVE:  Willy Aquino is a 64 year old female who is seen for left knee Durolane Injection . Patient was last seen on 06/12/2024 and had left knee Durolane injection. Patient reports relief lasting ~ 6.5 months.    Patient rates pain as 7/10 pre-procedure. Patient rates pain as 4/10 post-procedure.    Large Joint Injection/Arthocentesis: L knee joint    Date/Time: 2/5/2025 5:09 PM    Performed by: Yeo, Albert, MD  Authorized by: Yeo, Albert, MD    Indications:  Pain and osteoarthritis  Needle Size:  22 G  Guidance: ultrasound    Approach:  Superolateral  Location:  Knee      Medications:  60 mg sodium hyaluronate 60 MG/3ML; 5 mL lidocaine 1 %  Aspirate amount (mL):  18  Aspirate:  Yellow  Outcome:  Tolerated well, no immediate complications  Procedure discussed: discussed risks, benefits, and alternatives    Consent Given by:  Patient  Timeout: timeout called immediately prior to procedure    Prep: patient was prepped and draped in usual sterile fashion     Ultrasound was used to ensure safe and accurate needle placement and injection. Ultrasound images of the procedure were permanently stored.          Albert Yeo MD, [facility]  [facility]      Again, thank you for allowing me to participate in the care of your patient.        Sincerely,        Albert Yeo, MD    Electronically signed

## 2025-02-06 ENCOUNTER — TELEPHONE (OUTPATIENT)
Dept: ORTHOPEDICS | Facility: CLINIC | Age: 65
End: 2025-02-06
Payer: COMMERCIAL

## 2025-02-06 NOTE — TELEPHONE ENCOUNTER
Other: Patient called and stated she is having more pain then she did before having the shot in her knee. Patient would like to speak to the provider or team about what to do. Please call patient back.     Could we send this information to you in [employer] or would you prefer to receive a phone call?:   Patient would prefer a phone call   Okay to leave a detailed message?: Yes at Cell number on file:    Telephone Information:   Mobile [phone]

## 2025-02-06 NOTE — TELEPHONE ENCOUNTER
I made an outbound phone call and spoke with the patient regarding her concerns.  She is experiencing increased knee pain since receiving a hyaluronic acid injection yesterday by Dr. Yeo.  This is her second gel injection, and she tolerated the first 1 very well on June 12, 2024.  She reports mild warmth over the injection site, but denies swelling, redness, fever, nausea, or vomiting.  We discussed that should these things change, she should seek emergency care.  With regard to the pain we reviewed proper use of ibuprofen up to 800 mg 3 times daily, as she is currently taking 800 mg only once daily.  We discussed taking this with food.  We discussed taking Tylenol up to 1000 mg 3 times a day, alternated with ibuprofen, should she choose to take advantage of that.  We discussed that she should continue icing the area 20 minutes on 40 minutes off as dictated by pain.  Alternatively, we discussed that if she is not improving, and her concerns worsen, she should check into [facility] walk-in [facility] tomorrow between the hours of 8 AM and 5 PM, but our team could reevaluate her condition, and make additional recommendations, including ruling out infection.  We discussed that she could possibly be having an allergic reaction to the gel injection, sometimes seen on the second or third gel injection for a patient, however, we usually expect more swelling and redness over the joint with this.  Symptoms warranting emergency care were again reviewed, and patient understood.  She had no further questions and felt that her condition was understood and her questions were answered.  No further action needed.      Cristóbal Buckner, ATC

## 2025-02-07 ENCOUNTER — TELEPHONE (OUTPATIENT)
Dept: ORTHOPEDICS | Facility: CLINIC | Age: 65
End: 2025-02-07
Payer: COMMERCIAL

## 2025-04-22 NOTE — TELEPHONE ENCOUNTER
Duplicate encounter. Please see encounter dated 2/6/25 for further documentation regarding this call / issue. This information will be documented in that encounter.    Otilia Montes ATC    
Other: Patient returning call to [facility]. Patient feels that the pain is improving slightly, and she would like to see how the weekend goes before she goes to the hospital. Patient is taking 1000mg of ibuprofen at this time. Patient would like call back when able, and may do walk in if needed on Monday.      Could we send this information to you in [facility] or would you prefer to receive a phone call?:   Patient would prefer a phone call   Okay to leave a detailed message?: Yes at Home number on file [phone] (home)         
Mahendra Durant(Attending)

## 2025-05-14 DIAGNOSIS — J45.909 ASTHMA, UNSPECIFIED ASTHMA SEVERITY, UNSPECIFIED WHETHER COMPLICATED, UNSPECIFIED WHETHER PERSISTENT: ICD-10-CM

## 2025-05-15 RX ORDER — ALBUTEROL SULFATE 90 UG/1
1-2 INHALANT RESPIRATORY (INHALATION) EVERY 4 HOURS PRN
Qty: 8.5 G | Refills: 0 | Status: SHIPPED | OUTPATIENT
Start: 2025-05-15

## (undated) DEVICE — ENDO FORCEP SPIKED SERRATED SHAFT JUMBO 239CM G56998

## (undated) DEVICE — KIT ENDO TURNOVER/PROCEDURE W/CLEAN A SCOPE LINERS 103888

## (undated) RX ORDER — FENTANYL CITRATE 50 UG/ML
INJECTION, SOLUTION INTRAMUSCULAR; INTRAVENOUS
Status: DISPENSED
Start: 2024-06-17

## (undated) RX ORDER — SIMETHICONE 40MG/0.6ML
SUSPENSION, DROPS(FINAL DOSAGE FORM)(ML) ORAL
Status: DISPENSED
Start: 2024-06-17